# Patient Record
Sex: FEMALE | Race: WHITE | NOT HISPANIC OR LATINO | Employment: OTHER | URBAN - METROPOLITAN AREA
[De-identification: names, ages, dates, MRNs, and addresses within clinical notes are randomized per-mention and may not be internally consistent; named-entity substitution may affect disease eponyms.]

---

## 2018-03-19 ENCOUNTER — OFFICE VISIT (OUTPATIENT)
Dept: ORTHOPEDIC SURGERY | Facility: CLINIC | Age: 68
End: 2018-03-19

## 2018-03-19 VITALS
WEIGHT: 170 LBS | HEART RATE: 66 BPM | DIASTOLIC BLOOD PRESSURE: 74 MMHG | SYSTOLIC BLOOD PRESSURE: 151 MMHG | BODY MASS INDEX: 30.12 KG/M2 | HEIGHT: 63 IN

## 2018-03-19 DIAGNOSIS — S83.421A SPRAIN OF LATERAL COLLATERAL LIGAMENT OF RIGHT KNEE, INITIAL ENCOUNTER: ICD-10-CM

## 2018-03-19 DIAGNOSIS — S93.491A SPRAIN OF ANTERIOR TALOFIBULAR LIGAMENT OF RIGHT ANKLE, INITIAL ENCOUNTER: ICD-10-CM

## 2018-03-19 DIAGNOSIS — R52 PAIN: Primary | ICD-10-CM

## 2018-03-19 PROCEDURE — 99202 OFFICE O/P NEW SF 15 MIN: CPT | Performed by: ORTHOPAEDIC SURGERY

## 2018-03-19 NOTE — PROGRESS NOTES
NEW PATIENT    Patient: Aliyah Mayfield  : 1950    Primary Care Provider: Frank Colunga MD    Requesting Provider: As above    Pain and Edema of the Right Knee; Pain and Edema of the Right Ankle; and Pain of the Right Hip      History    Chief Complaint: right knee and ankle injury    History of Present Illness: this is an extremely pleasant 67 year old woman here with her .  Yesterday, she was getting out of her car (passenger side) when her right ankle got tangled in her purse strap, causing her to fall onto the pavement with her right foot still in the car.  She has had right ankle and knee pain and swelling, with some sciatica intermittently.  She has taken advil and some pain meds her  had from the dentist.  She notes that today the ankle and sciatica are improved, the knee is very painful with weight bearing.   She has been nauseated with the pain at times.  No other injury.      No current outpatient prescriptions on file prior to visit.     No current facility-administered medications on file prior to visit.       Allergies   Allergen Reactions   • Codeine Anaphylaxis     rash   • Penicillins Anaphylaxis     Rash      History reviewed. No pertinent past medical history.  Past Surgical History:   Procedure Laterality Date   • CARPAL TUNNEL RELEASE     • CHOLECYSTECTOMY     • HAND SURGERY Right     CMCJ replacement- Dr. Olivares   • LUMBAR DISCECTOMY     • TONSILLECTOMY AND ADENOIDECTOMY     • WRIST SURGERY Right     ORIF     Family History   Problem Relation Age of Onset   • Hypertension Mother    • Heart attack Mother    • Stroke Mother    • Hypertension Father    • Heart attack Father    • Cancer Father       Social History     Social History   • Marital status:      Spouse name: N/A   • Number of children: N/A   • Years of education: N/A     Occupational History   • Not on file.     Social History Main Topics   • Smoking status: Former Smoker     Types: Cigarettes      "Start date: 1970     Quit date: 1980   • Smokeless tobacco: Never Used   • Alcohol use No   • Drug use: No   • Sexual activity: Defer     Other Topics Concern   • Not on file     Social History Narrative   • No narrative on file        Review of Systems   Constitutional: Negative.    HENT: Negative.    Eyes: Negative.    Respiratory: Negative.    Cardiovascular: Negative.    Gastrointestinal: Negative.    Endocrine: Negative.    Genitourinary: Negative.    Musculoskeletal: Positive for arthralgias, joint swelling and myalgias.   Skin: Negative.    Allergic/Immunologic: Negative.    Neurological: Negative.    Hematological: Negative.    Psychiatric/Behavioral: Negative.        The following portions of the patient's history were reviewed and updated as appropriate: allergies, current medications, past family history, past medical history, past social history, past surgical history and problem list.    Physical Exam:   /74   Pulse 66   Ht 160 cm (63\")   Wt 77.1 kg (170 lb)   BMI 30.11 kg/m²   GENERAL: Body habitus: overweight    Lower extremity edema: Left: none; Right: 1+ pitting    Varicose veins:  Left: none; Right: none    Gait: in wheelchair     Mental Status:  awake and alert; oriented to person, place, and time    Voice:  clear  SKIN:  Normal and warm and dry    Hair Growth:  Right:normal; Left:  normal  NAILS: Toenails: normal  HEENT: Head: Normocephalic, atraumatic,  without obvious abnormality.  eye: normal external eye, no icterus  ears: normal external ears  nose: normal external nose  pharynx: dental hygiene adequate  PULM:  Repiratory effort normal  CV:  Dorsalis Pedis:  Right: 2+; Left:2+    Posterior Tibial: Right:2+; Left:2+    Capillary Refill:  Brisk  MSK:  Hand:sensation intact      Tibia:  Right:  right knee is tender over the LCL and lateral joint line, stable to varus and valgus.  trace effusion, stable to anterior and posterior drawer and lachmans.  full active extension, flexion to " 90 deg.  tender over posterior capsule.  mild Tinel's at common peroneal over fibular head, distal sensation intact to light touch; Left:  non tender      Ankle:  Right: no swelling around ankle, very slightly tender over anterior ankle, stable to anterior drawer, full ROM, sensation intact, all motors fire; Left:  non tender      Foot:  Right:  non tender; Left:  non tender      NEURO: Heel Walking:  Right:  unable to test; Left:  unable to test    Toe Walking:  Right:  unable to test; Left:  unable to test     Banner-Elmer 5.07 monofilament test: not evaluated    Lower extremity sensation: intact          Calf Atrophy:none    Motor Function: all 5/5- on the right leg all motors fire, some give-way weaknes at the knee due to pain, quad mechanism intact         Medical Decision Making    Data Review:   ordered and reviewed x-rays today    Assessment and Plan/ Diagnosis/Treatment options:   1.Sprain of lateral collateral ligament of right knee, initial encounter  She has an LCL sprain, not unstable.  No fracture and only minimal effusion on xray indicates little intra-articular pathology.  She also has a sprain of the ankle, due to the twisting mechanism of injury.  I recommend a knee immobilizer, she may weight bear as tolerated.  I will see her again in 2 weeks for knee xray (weight bearing if possible) and likely start PT at that time.  She was given a 1 week off work note, if she needs longer she will call back.  We also gave her crutches to assist mobilit  -  Knee Orthosis, Immobilizer, Canvas Longitudinal, Prefabricated, Off-The-shelf    3. Sprain of anterior talofibular ligament of right ankle, initial encounter  As above

## 2018-04-02 ENCOUNTER — OFFICE VISIT (OUTPATIENT)
Dept: ORTHOPEDIC SURGERY | Facility: CLINIC | Age: 68
End: 2018-04-02

## 2018-04-02 DIAGNOSIS — S83.421D SPRAIN OF LATERAL COLLATERAL LIGAMENT OF RIGHT KNEE, SUBSEQUENT ENCOUNTER: Primary | ICD-10-CM

## 2018-04-02 DIAGNOSIS — S93.491D SPRAIN OF ANTERIOR TALOFIBULAR LIGAMENT OF RIGHT ANKLE, SUBSEQUENT ENCOUNTER: ICD-10-CM

## 2018-04-02 PROCEDURE — 99212 OFFICE O/P EST SF 10 MIN: CPT | Performed by: ORTHOPAEDIC SURGERY

## 2018-04-02 NOTE — PROGRESS NOTES
ESTABLISHED PATIENT    Patient: Aliyha Mayfield  : 1950    Primary Care Provider: Frank Colunga MD    Requesting Provider: As above    Follow-up (2 weeks - Sprain of lateral collateral ligament of right knee & Sprain of anterior talofibular ligament of right ankle)          History of Present Illness: Right knee pain right ankle pain, she has been wearing the knee immobilizer.  Her swelling and pain have diminished.  She still sore on the lateral knee.    No current outpatient prescriptions on file prior to visit.     No current facility-administered medications on file prior to visit.       Allergies   Allergen Reactions   • Codeine Anaphylaxis     rash   • Penicillins Anaphylaxis     Rash      History reviewed. No pertinent past medical history.  Past Surgical History:   Procedure Laterality Date   • CARPAL TUNNEL RELEASE     • CHOLECYSTECTOMY     • HAND SURGERY Right     CMCJ replacement- Dr. Olivares   • LUMBAR DISCECTOMY     • TONSILLECTOMY AND ADENOIDECTOMY     • WRIST SURGERY Right     ORIF     Family History   Problem Relation Age of Onset   • Hypertension Mother    • Heart attack Mother    • Stroke Mother    • Hypertension Father    • Heart attack Father    • Cancer Father       Social History     Social History   • Marital status:      Spouse name: N/A   • Number of children: N/A   • Years of education: N/A     Occupational History   • Not on file.     Social History Main Topics   • Smoking status: Former Smoker     Types: Cigarettes     Start date:      Quit date:    • Smokeless tobacco: Never Used   • Alcohol use No   • Drug use: No   • Sexual activity: Defer     Other Topics Concern   • Not on file     Social History Narrative   • No narrative on file        Review of Systems    The following portions of the patient's history were reviewed and updated as appropriate: allergies, current medications, past family history, past medical history, past social history, past surgical  history and problem list.    Physical Exam:   There were no vitals taken for this visit.  GENERAL: Body habitus: overweight    Right knee has a trace effusion.  Mild swelling laterally.  Active quad extension.  Flexion to about 90°.  Pain with varus stress over the lateral ligaments, but it is stable.  No medial instability no medial pain.  Tender over the lateral collateral ligament.  Mildly tender over the ankle anterior talofibular ligament  Medical Decision Making    Data Review:   ordered and reviewed x-rays today    Assessment/Plan/Diagnosis/Treatment Options:   1. Sprain of lateral collateral ligament of right knee, subsequent encounter  Radiographs do not show any occult fracture in the knee.  I think she may start weaning out of the knee immobilizer and start physical therapy.  Would recommend PT for the knee and the ankle.  I'll see her again in 6-8 weeks.

## 2018-11-07 ENCOUNTER — TRANSCRIBE ORDERS (OUTPATIENT)
Dept: ADMINISTRATIVE | Facility: HOSPITAL | Age: 68
End: 2018-11-07

## 2018-11-07 DIAGNOSIS — Z12.31 VISIT FOR SCREENING MAMMOGRAM: Primary | ICD-10-CM

## 2018-11-07 DIAGNOSIS — N95.9 MENOPAUSAL AND POSTMENOPAUSAL DISORDER: ICD-10-CM

## 2018-11-30 DIAGNOSIS — Z12.11 SCREENING FOR COLON CANCER: Primary | ICD-10-CM

## 2018-11-30 RX ORDER — SODIUM, POTASSIUM,MAG SULFATES 17.5-3.13G
SOLUTION, RECONSTITUTED, ORAL ORAL
Qty: 2 BOTTLE | Refills: 0 | Status: SHIPPED | OUTPATIENT
Start: 2018-11-30 | End: 2019-03-12

## 2018-12-07 ENCOUNTER — OUTSIDE FACILITY SERVICE (OUTPATIENT)
Dept: GASTROENTEROLOGY | Facility: CLINIC | Age: 68
End: 2018-12-07

## 2018-12-07 ENCOUNTER — LAB REQUISITION (OUTPATIENT)
Dept: LAB | Facility: HOSPITAL | Age: 68
End: 2018-12-07

## 2018-12-07 DIAGNOSIS — R19.7 DIARRHEA: ICD-10-CM

## 2018-12-07 PROCEDURE — 88305 TISSUE EXAM BY PATHOLOGIST: CPT | Performed by: INTERNAL MEDICINE

## 2018-12-07 PROCEDURE — 45380 COLONOSCOPY AND BIOPSY: CPT | Performed by: INTERNAL MEDICINE

## 2018-12-07 PROCEDURE — 43239 EGD BIOPSY SINGLE/MULTIPLE: CPT | Performed by: INTERNAL MEDICINE

## 2018-12-10 LAB
CYTO UR: NORMAL
LAB AP CASE REPORT: NORMAL
LAB AP CLINICAL INFORMATION: NORMAL
PATH REPORT.FINAL DX SPEC: NORMAL
PATH REPORT.GROSS SPEC: NORMAL

## 2018-12-14 ENCOUNTER — TELEPHONE (OUTPATIENT)
Dept: GASTROENTEROLOGY | Facility: CLINIC | Age: 68
End: 2018-12-14

## 2018-12-14 DIAGNOSIS — K28.9 ANASTOMOTIC ULCER S/P GASTRIC BYPASS: Primary | ICD-10-CM

## 2018-12-14 DIAGNOSIS — T85.898A ANASTOMOTIC ULCER S/P GASTRIC BYPASS: Primary | ICD-10-CM

## 2018-12-14 RX ORDER — ESOMEPRAZOLE MAGNESIUM 40 MG/1
40 CAPSULE, DELAYED RELEASE ORAL DAILY
Qty: 30 CAPSULE | Refills: 5 | Status: SHIPPED | OUTPATIENT
Start: 2018-12-14 | End: 2019-03-12

## 2018-12-14 NOTE — TELEPHONE ENCOUNTER
I called and spoke with MsJose Tran on the phone this morning.  She did have some ulceration at the gastrojejunal anastomosis.  I will go ahead and prescribe medication at this time.

## 2018-12-28 ENCOUNTER — HOSPITAL ENCOUNTER (OUTPATIENT)
Dept: BONE DENSITY | Facility: HOSPITAL | Age: 68
Discharge: HOME | End: 2018-12-28
Attending: INTERNAL MEDICINE

## 2018-12-28 ENCOUNTER — HOSPITAL ENCOUNTER (OUTPATIENT)
Dept: MAMMOGRAPHY | Facility: HOSPITAL | Age: 68
Discharge: HOME | End: 2018-12-28
Attending: INTERNAL MEDICINE | Admitting: INTERNAL MEDICINE

## 2018-12-28 DIAGNOSIS — Z12.31 VISIT FOR SCREENING MAMMOGRAM: ICD-10-CM

## 2018-12-28 DIAGNOSIS — N95.9 MENOPAUSAL AND POSTMENOPAUSAL DISORDER: ICD-10-CM

## 2018-12-28 PROCEDURE — 77067 SCR MAMMO BI INCL CAD: CPT

## 2018-12-28 PROCEDURE — 77063 BREAST TOMOSYNTHESIS BI: CPT

## 2018-12-28 PROCEDURE — 77063 BREAST TOMOSYNTHESIS BI: CPT | Performed by: RADIOLOGY

## 2018-12-28 PROCEDURE — 77080 DXA BONE DENSITY AXIAL: CPT

## 2018-12-28 PROCEDURE — 77067 SCR MAMMO BI INCL CAD: CPT | Performed by: RADIOLOGY

## 2019-01-07 PROBLEM — K25.3 ACUTE GASTRIC ULCER WITHOUT HEMORRHAGE OR PERFORATION: Status: ACTIVE | Noted: 2019-01-07

## 2019-01-07 PROBLEM — K57.30 DIVERTICULOSIS OF LARGE INTESTINE: Status: ACTIVE | Noted: 2019-01-07

## 2019-03-08 PROBLEM — M54.2 CERVICALGIA: Status: ACTIVE | Noted: 2019-03-08

## 2019-03-08 PROBLEM — K57.30 DIVERTICULOSIS OF LARGE INTESTINE WITHOUT HEMORRHAGE: Status: ACTIVE | Noted: 2019-03-08

## 2019-03-08 PROBLEM — R55 SYNCOPE AND COLLAPSE: Status: ACTIVE | Noted: 2019-03-08

## 2019-03-08 PROBLEM — M54.50 LOW BACK PAIN AT MULTIPLE SITES: Status: ACTIVE | Noted: 2019-03-08

## 2019-03-08 PROBLEM — N95.9 MENOPAUSAL AND PERIMENOPAUSAL DISORDER: Status: ACTIVE | Noted: 2019-03-08

## 2019-03-08 PROBLEM — G43.109 MIGRAINE WITH AURA AND WITHOUT STATUS MIGRAINOSUS, NOT INTRACTABLE: Status: ACTIVE | Noted: 2019-03-08

## 2019-03-08 PROBLEM — R42 VERTIGO: Status: ACTIVE | Noted: 2019-03-08

## 2019-03-08 PROBLEM — F32.0 MILD MAJOR DEPRESSION (HCC): Status: ACTIVE | Noted: 2019-03-08

## 2019-03-08 PROBLEM — I10 ESSENTIAL (PRIMARY) HYPERTENSION: Status: ACTIVE | Noted: 2019-03-08

## 2019-03-08 PROBLEM — Z91.09 ENVIRONMENTAL ALLERGIES: Status: ACTIVE | Noted: 2019-03-08

## 2019-03-12 ENCOUNTER — OFFICE VISIT (OUTPATIENT)
Dept: INTERNAL MEDICINE | Facility: CLINIC | Age: 69
End: 2019-03-12

## 2019-03-12 VITALS
HEART RATE: 64 BPM | WEIGHT: 179 LBS | SYSTOLIC BLOOD PRESSURE: 106 MMHG | DIASTOLIC BLOOD PRESSURE: 64 MMHG | HEIGHT: 63 IN | BODY MASS INDEX: 31.71 KG/M2

## 2019-03-12 DIAGNOSIS — R30.0 DYSURIA: ICD-10-CM

## 2019-03-12 DIAGNOSIS — N30.00 ACUTE CYSTITIS WITHOUT HEMATURIA: Primary | ICD-10-CM

## 2019-03-12 LAB
BILIRUB BLD-MCNC: ABNORMAL MG/DL
CLARITY, POC: ABNORMAL
COLOR UR: ABNORMAL
GLUCOSE UR STRIP-MCNC: NEGATIVE MG/DL
KETONES UR QL: ABNORMAL
LEUKOCYTE EST, POC: NEGATIVE
NITRITE UR-MCNC: NEGATIVE MG/ML
PH UR: 5.5 [PH] (ref 5–8)
PROT UR STRIP-MCNC: ABNORMAL MG/DL
RBC # UR STRIP: ABNORMAL /UL
SP GR UR: 1.03 (ref 1–1.03)
UROBILINOGEN UR QL: NORMAL

## 2019-03-12 PROCEDURE — 81003 URINALYSIS AUTO W/O SCOPE: CPT | Performed by: NURSE PRACTITIONER

## 2019-03-12 PROCEDURE — 99213 OFFICE O/P EST LOW 20 MIN: CPT | Performed by: NURSE PRACTITIONER

## 2019-03-12 RX ORDER — SULFAMETHOXAZOLE AND TRIMETHOPRIM 800; 160 MG/1; MG/1
1 TABLET ORAL 2 TIMES DAILY
Qty: 10 TABLET | Refills: 0 | Status: SHIPPED | OUTPATIENT
Start: 2019-03-12 | End: 2019-04-30

## 2019-03-12 RX ORDER — FLUCONAZOLE 150 MG/1
150 TABLET ORAL ONCE
Qty: 1 TABLET | Refills: 1 | Status: SHIPPED | OUTPATIENT
Start: 2019-03-12 | End: 2019-03-12

## 2019-03-12 NOTE — PROGRESS NOTES
Aliyah Mayfield  1950  [id number]  Patient Care Team:  Frank Colunga MD as PCP - General (Internal Medicine)    Aliyah Mayfield is a pleasant 68 y.o. female who presents for evaluation of Urinary Tract Infection (burning)      Chief Complaint   Patient presents with   • Urinary Tract Infection     burning       HPI:   Dysuria x 4 days.  No abd pain, n/v or fever.  No hematuria. Has increased hydration.  Last UTI about 2 mo ago.    Past Medical History:   Diagnosis Date   • Acute gastric ulcer without hemorrhage or perforation 2019   • Ankle fracture    • Carpal tunnel syndrome    • Diverticulosis    • Hearing loss    • Hypertension    • Lumbar back pain    • Situational depression    • Syncope and collapse    • Wrist fracture        Past Surgical History:   Procedure Laterality Date   • BREAST EXCISIONAL BIOPSY      Age 22   • CARPAL TUNNEL RELEASE     • CERVICAL LAMINECTOMY     • CERVICAL LAMINECTOMY     • CHOLECYSTECTOMY     • GASTRIC BYPASS     • HAND SURGERY Right     CMCJ replacement- Dr. Olivares   • JOINT REPLACEMENT      Thumb Joint Replacement   • LUMBAR DISCECTOMY     • TONSILLECTOMY AND ADENOIDECTOMY     • TUBAL ABDOMINAL LIGATION Bilateral    • WRIST SURGERY Right     ORIF       Family History   Problem Relation Age of Onset   • Hypertension Mother    • Heart attack Mother    • Stroke Mother    • Obesity Mother    • Hypertension Father    • Heart attack Father 45   • Cancer Father    • Breast cancer Maternal Aunt    • Heart attack Brother 52   • Ovarian cancer Neg Hx        Social History     Tobacco Use   Smoking Status Former Smoker   • Types: Cigarettes   • Start date:    • Last attempt to quit:    • Years since quittin.2   Smokeless Tobacco Never Used   Tobacco Comment    quit 1992       Allergies   Allergen Reactions   • Codeine Anaphylaxis     rash   • Penicillins Anaphylaxis     Rash       Review of Systems   Constitutional: Negative for chills, fatigue  "and fever.   HENT: Negative for congestion, ear pain and sinus pressure.    Respiratory: Negative for cough, chest tightness, shortness of breath and wheezing.    Cardiovascular: Negative for chest pain and palpitations.   Gastrointestinal: Negative for abdominal pain, blood in stool and constipation.   Skin: Negative for color change.   Allergic/Immunologic: Negative for environmental allergies.   Neurological: Negative for dizziness, speech difficulty and headache.   Psychiatric/Behavioral: Negative for decreased concentration. The patient is not nervous/anxious.        Vitals:    03/12/19 1002   BP: 106/64   BP Location: Left arm   Patient Position: Sitting   Cuff Size: Adult   Pulse: 64   Weight: 81.2 kg (179 lb)   Height: 160 cm (63\")         Current Outpatient Medications:   •  fluconazole (DIFLUCAN) 150 MG tablet, Take 1 tablet by mouth 1 (One) Time for 1 dose., Disp: 1 tablet, Rfl: 1  •  sulfamethoxazole-trimethoprim (BACTRIM DS) 800-160 MG per tablet, Take 1 tablet by mouth 2 (Two) Times a Day., Disp: 10 tablet, Rfl: 0    Physical Exam   Constitutional: She appears well-developed and well-nourished.   Pulmonary/Chest: Effort normal.   Skin: Skin is warm and dry.   Psychiatric: She has a normal mood and affect. Her behavior is normal.   Vitals reviewed.        Assessment/Plan:    Problem List Items Addressed This Visit     None      Visit Diagnoses     Acute cystitis without hematuria    -  Primary    Dysuria        Relevant Orders    POC Urinalysis Dipstick, Automated (Completed)    Urine Culture - Urine, Urine, Clean Catch          Plan of care reviewed with patient at the conclusion of today's visit. Education was provided regarding diagnosis, management and any prescribed or recommended OTC medications.  Patient verbalizes understanding of and agreement with management plan.    Return if symptoms worsen or fail to improve.    *Note that portions of this note were completed with a voice recognition " program.  Efforts were made to edit the dictation but occasionally words are transcribed.    Stephanie Younger, APRN

## 2019-03-18 ENCOUNTER — OFFICE VISIT (OUTPATIENT)
Dept: INTERNAL MEDICINE | Facility: CLINIC | Age: 69
End: 2019-03-18

## 2019-03-18 VITALS
BODY MASS INDEX: 30.65 KG/M2 | HEART RATE: 64 BPM | WEIGHT: 173 LBS | DIASTOLIC BLOOD PRESSURE: 66 MMHG | SYSTOLIC BLOOD PRESSURE: 126 MMHG | HEIGHT: 63 IN

## 2019-03-18 DIAGNOSIS — M54.50 LOW BACK PAIN AT MULTIPLE SITES: ICD-10-CM

## 2019-03-18 DIAGNOSIS — G43.109 MIGRAINE WITH AURA AND WITHOUT STATUS MIGRAINOSUS, NOT INTRACTABLE: ICD-10-CM

## 2019-03-18 DIAGNOSIS — F32.0 MILD MAJOR DEPRESSION (HCC): ICD-10-CM

## 2019-03-18 DIAGNOSIS — I10 ESSENTIAL (PRIMARY) HYPERTENSION: Primary | ICD-10-CM

## 2019-03-18 DIAGNOSIS — K25.3 ACUTE GASTRIC ULCER WITHOUT HEMORRHAGE OR PERFORATION: ICD-10-CM

## 2019-03-18 DIAGNOSIS — E66.09 CLASS 1 OBESITY DUE TO EXCESS CALORIES WITH SERIOUS COMORBIDITY IN ADULT, UNSPECIFIED BMI: ICD-10-CM

## 2019-03-18 DIAGNOSIS — K12.0 APHTHOUS ULCER OF MOUTH: ICD-10-CM

## 2019-03-18 PROBLEM — E66.9 CLASS 1 OBESITY IN ADULT: Status: ACTIVE | Noted: 2019-03-18

## 2019-03-18 PROCEDURE — 99214 OFFICE O/P EST MOD 30 MIN: CPT | Performed by: INTERNAL MEDICINE

## 2019-03-18 RX ORDER — ESOMEPRAZOLE MAGNESIUM 40 MG/1
40 CAPSULE, DELAYED RELEASE ORAL
Qty: 30 CAPSULE | Refills: 11 | Status: SHIPPED | OUTPATIENT
Start: 2019-03-18 | End: 2019-04-30 | Stop reason: SDUPTHER

## 2019-03-18 NOTE — PROGRESS NOTES
"Chief Complaint   Patient presents with   • Follow-up     Follow up on Hypertension.   History of Present Illness  68 y.o. white female presents for follow up on hypertension. She does not check it on her own. She was prescribed a new medication for gastric ulcers and is watching her diet closely. Still has some vomiting. Cant remember what medication she was prescribed. Her migraines are rare and made worse by the weather and allergies. Uses excedrin migraine.     Review of Systems   Constitutional: Negative for chills and fever.   Respiratory: Negative for cough and shortness of breath.    Cardiovascular: Negative for chest pain and palpitations.   Gastrointestinal: Positive for vomiting. Negative for abdominal pain and nausea.   Musculoskeletal: Positive for arthralgias and back pain.   Skin: Negative for rash.   Allergic/Immunologic: Positive for environmental allergies.   Neurological: Positive for dizziness and headaches. Negative for light-headedness.   Psychiatric/Behavioral: Negative for sleep disturbance.   All other systems reviewed and are negative.    All other ROS reviewed and negative.    PMSFH:  The following portions of the patient's history were reviewed and updated as appropriate: allergies, current medications, past family history, past medical history, past social history, past surgical history and problem list.      Current Outpatient Medications:   •  esomeprazole (nexIUM) 40 MG capsule, Take 1 capsule by mouth Every Morning Before Breakfast., Disp: 30 capsule, Rfl: 11  •  sulfamethoxazole-trimethoprim (BACTRIM DS) 800-160 MG per tablet, Take 1 tablet by mouth 2 (Two) Times a Day., Disp: 10 tablet, Rfl: 0    VITALS:  /66   Pulse 64   Ht 160 cm (62.99\")   Wt 78.5 kg (173 lb)   BMI 30.65 kg/m²     Physical Exam   Constitutional: She is oriented to person, place, and time. She appears well-developed and well-nourished.   HENT:   Head: Normocephalic.   Right Ear: External ear normal. "   Left Ear: External ear normal.   Mouth/Throat: No oropharyngeal exudate.   2 apthous ulcers   Eyes: Conjunctivae and EOM are normal.   Cardiovascular: Normal rate, regular rhythm and normal heart sounds.   Pulmonary/Chest: Effort normal and breath sounds normal. No respiratory distress.   Abdominal: Soft. Bowel sounds are normal. There is no tenderness.   Musculoskeletal: She exhibits no edema.   Lymphadenopathy:     She has no cervical adenopathy.   Neurological: She is alert and oriented to person, place, and time.   Skin: Skin is warm and dry.   Psychiatric: She has a normal mood and affect. Her behavior is normal.   Nursing note and vitals reviewed.      LABS:  Results for orders placed or performed in visit on 03/12/19   POC Urinalysis Dipstick, Automated   Result Value Ref Range    Color Dark Yellow Yellow, Straw, Dark Yellow, Cara    Clarity, UA Slightly Cloudy (A) Clear    Specific Gravity  1.030 1.005 - 1.030    pH, Urine 5.5 5.0 - 8.0    Leukocytes Negative Negative    Nitrite, UA Negative Negative    Protein, POC Trace (A) Negative mg/dL    Glucose, UA Negative Negative, 1000 mg/dL (3+) mg/dL    Ketones, UA Trace (A) Negative    Urobilinogen, UA Normal Normal    Bilirubin Small (1+) (A) Negative    Blood, UA Trace (A) Negative       Procedures    ASSESSMENT/PLAN:  Problem List Items Addressed This Visit        Cardiovascular and Mediastinum    Essential (primary) hypertension - Primary     Discussed with the patient a low sodium diet (<2000mg/day) and discussed increasing regular aerobic exercise.  Recommend monitoring blood pressures with goal < 130 systolic and < 80 diastolic.         Migraine with aura and without status migrainosus, not intractable     Use tylenol as needed.               Digestive    Acute gastric ulcer without hemorrhage or perforation     Use 40mg esomeprazole capsule daily. Watch the diet especially avoiding fatty, spicy, and acidic foods.          Relevant Medications     esomeprazole (nexIUM) 40 MG capsule    Class 1 obesity in adult     Goal to consume large amounts of vegetables and fruits,heart healthy fats and low carbohydrate choices. Encourage aerobic exercise of walking, jogging or biking gradually up to 150 minute a week and 2-3 times of weight resistance. Employe behavioral modifications such as daily meditation and stopping eating and drinking calories after 7 pm.             Nervous and Auditory    Low back pain at multiple sites     Stretch daily.             Other    Mild major depression (CMS/HCC)     Counseled patient regarding multimodal approach with healthy nutrition, healthy sleep, regular physical activity, social activities, and meditation.         Aphthous ulcer of mouth     Use lysine 1000 mg to prevent               FOLLOW-UP:  Return in about 6 months (around 9/18/2019) for Medicare Wellness.      Electronically signed by:    Rick Carolina MD

## 2019-03-18 NOTE — ASSESSMENT & PLAN NOTE
Discussed with the patient a low sodium diet (<2000mg/day) and discussed increasing regular aerobic exercise.  Recommend monitoring blood pressures with goal < 130 systolic and < 80 diastolic.

## 2019-03-18 NOTE — ASSESSMENT & PLAN NOTE
Use 40mg esomeprazole capsule daily. Watch the diet especially avoiding fatty, spicy, and acidic foods.

## 2019-04-30 ENCOUNTER — OFFICE VISIT (OUTPATIENT)
Dept: INTERNAL MEDICINE | Facility: CLINIC | Age: 69
End: 2019-04-30

## 2019-04-30 VITALS
BODY MASS INDEX: 30.12 KG/M2 | WEIGHT: 170 LBS | SYSTOLIC BLOOD PRESSURE: 122 MMHG | TEMPERATURE: 97 F | HEIGHT: 63 IN | DIASTOLIC BLOOD PRESSURE: 74 MMHG | HEART RATE: 72 BPM

## 2019-04-30 DIAGNOSIS — J40 BRONCHITIS: Primary | ICD-10-CM

## 2019-04-30 PROCEDURE — 99213 OFFICE O/P EST LOW 20 MIN: CPT | Performed by: NURSE PRACTITIONER

## 2019-04-30 RX ORDER — ALBUTEROL SULFATE 90 UG/1
2 AEROSOL, METERED RESPIRATORY (INHALATION) EVERY 4 HOURS PRN
Qty: 1 INHALER | Refills: 0 | Status: SHIPPED | OUTPATIENT
Start: 2019-04-30 | End: 2019-10-07 | Stop reason: ALTCHOICE

## 2019-04-30 RX ORDER — AZITHROMYCIN 250 MG/1
TABLET, FILM COATED ORAL
Qty: 6 TABLET | Refills: 0 | Status: SHIPPED | OUTPATIENT
Start: 2019-04-30 | End: 2019-10-07 | Stop reason: ALTCHOICE

## 2019-04-30 RX ORDER — ESOMEPRAZOLE MAGNESIUM 40 MG/1
40 CAPSULE, DELAYED RELEASE ORAL
Qty: 30 CAPSULE | Refills: 11 | Status: SHIPPED | OUTPATIENT
Start: 2019-04-30 | End: 2019-04-30

## 2019-04-30 NOTE — PATIENT INSTRUCTIONS
Use albuterol 2 puffs every 4-6 hours as needed for wheezing, shortness of breath, chest tightness or excessive coughing.  Use zpack as directed  If not better or worse symptoms before flight on Friday return for steroid injection.  Continue mucinex twice a day

## 2019-04-30 NOTE — PROGRESS NOTES
Aliyah Mayfield  1950  [id number]  Patient Care Team:  Rick Carolina MD as PCP - General (Internal Medicine)    Aliyah Mayfield is a pleasant 68 y.o. female who presents for evaluation of Cough (productive cough with yellow sputum )      Chief Complaint   Patient presents with   • Cough     productive cough with yellow sputum        HPI:   Cough x few days, productive today and fever. Had scratchy throat, ear pressure. oritinally thought it was allergy to feather pillows after staying overnight at someone's house.  Denies wheezing, sob  Used mucinex without improvement.  Hx of seasonal allergies.  Sometimes uses singulair, no antihistamine    Past Medical History:   Diagnosis Date   • Acute gastric ulcer without hemorrhage or perforation 2019   • Ankle fracture    • Carpal tunnel syndrome    • Diverticulosis    • Hearing loss    • Hypertension    • Lumbar back pain    • Situational depression    • Syncope and collapse    • Wrist fracture        Past Surgical History:   Procedure Laterality Date   • BREAST EXCISIONAL BIOPSY      Age 22   • CARPAL TUNNEL RELEASE     • CERVICAL LAMINECTOMY     • CERVICAL LAMINECTOMY     • CHOLECYSTECTOMY     • GASTRIC BYPASS     • HAND SURGERY Right     CMCJ replacement- Dr. Olivares   • JOINT REPLACEMENT      Thumb Joint Replacement   • LUMBAR DISCECTOMY     • TONSILLECTOMY AND ADENOIDECTOMY     • TUBAL ABDOMINAL LIGATION Bilateral    • WRIST SURGERY Right     ORIF       Family History   Problem Relation Age of Onset   • Hypertension Mother    • Heart attack Mother    • Stroke Mother    • Obesity Mother    • Hypertension Father    • Heart attack Father 45   • Cancer Father    • Breast cancer Maternal Aunt    • Heart attack Brother 52   • Ovarian cancer Neg Hx        Social History     Tobacco Use   Smoking Status Former Smoker   • Types: Cigarettes   • Start date:    • Last attempt to quit:    • Years since quittin.3   Smokeless Tobacco Never Used  "  Tobacco Comment    quit 1/1/1992       Allergies   Allergen Reactions   • Codeine Anaphylaxis     rash   • Penicillins Anaphylaxis     Rash       Review of Systems   Constitutional: Positive for fatigue. Negative for chills and fever.   HENT: Positive for congestion, ear pain, sinus pressure and sore throat.    Respiratory: Positive for cough and chest tightness. Negative for shortness of breath and wheezing.    Cardiovascular: Negative for chest pain and palpitations.   Gastrointestinal: Negative for abdominal pain, blood in stool and constipation.   Skin: Negative for color change.   Allergic/Immunologic: Positive for environmental allergies.   Neurological: Positive for dizziness. Negative for speech difficulty and headache.   Psychiatric/Behavioral: Negative for decreased concentration. The patient is not nervous/anxious.        Vitals:    04/30/19 1642   BP: 122/74   BP Location: Left arm   Patient Position: Sitting   Cuff Size: Adult   Pulse: 72   Temp: 97 °F (36.1 °C)   TempSrc: Temporal   Weight: 77.1 kg (170 lb)   Height: 160 cm (62.99\")   PainSc: 0-No pain         Current Outpatient Medications:   •  albuterol sulfate  (90 Base) MCG/ACT inhaler, Inhale 2 puffs Every 4 (Four) Hours As Needed for Wheezing or Shortness of Air., Disp: 1 inhaler, Rfl: 0  •  azithromycin (ZITHROMAX Z-LIAM) 250 MG tablet, Take 2 tablets the first day, then 1 tablet daily for 4 days., Disp: 6 tablet, Rfl: 0    Physical Exam   Constitutional: She appears well-developed and well-nourished.   HENT:   Head: Normocephalic and atraumatic.   Right Ear: External ear normal.   Left Ear: External ear normal.   Mouth/Throat: Oropharynx is clear and moist.   Eyes: Conjunctivae and EOM are normal.   Neck: Normal range of motion. Neck supple.   Cardiovascular: Normal rate, regular rhythm and normal heart sounds.   Pulmonary/Chest: Effort normal and breath sounds normal. She has no decreased breath sounds. She has no wheezes. She has no " rhonchi.   Abdominal: Soft. Bowel sounds are normal.   Musculoskeletal: Normal range of motion.   Lymphadenopathy:     She has no cervical adenopathy.   Neurological: She is alert.   Skin: Skin is warm and dry.   Psychiatric: She has a normal mood and affect. Her behavior is normal. Thought content normal.         Assessment/Plan:    Problem List Items Addressed This Visit     None      Visit Diagnoses     Bronchitis    -  Primary    Relevant Medications    albuterol sulfate  (90 Base) MCG/ACT inhaler    azithromycin (ZITHROMAX Z-LIAM) 250 MG tablet          Plan of care reviewed with patient at the conclusion of today's visit. Education was provided regarding diagnosis, management and any prescribed or recommended OTC medications.  Patient verbalizes understanding of and agreement with management plan.    Return if symptoms worsen or fail to improve.    *Note that portions of this note were completed with a voice recognition program.  Efforts were made to edit the dictation but occasionally words are transcribed.    LIZ De Anda

## 2019-06-19 ENCOUNTER — TELEPHONE (OUTPATIENT)
Dept: INTERNAL MEDICINE | Facility: CLINIC | Age: 69
End: 2019-06-19

## 2019-06-19 NOTE — TELEPHONE ENCOUNTER
In March you ordered a POCT iurinalysis & urine culture.    The urinalysis was performed.    The urine culture is not marked as collected & does not have results.     Can the urine culture order from March be canceled?

## 2019-09-11 ENCOUNTER — TELEPHONE (OUTPATIENT)
Dept: INTERNAL MEDICINE | Facility: CLINIC | Age: 69
End: 2019-09-11

## 2019-09-11 DIAGNOSIS — N39.0 ACUTE UTI: Primary | ICD-10-CM

## 2019-09-11 PROCEDURE — 81003 URINALYSIS AUTO W/O SCOPE: CPT | Performed by: INTERNAL MEDICINE

## 2019-09-11 NOTE — TELEPHONE ENCOUNTER
I will put in POC urine and please see if on call with treat with positive or I can send in something tonight  There is a urine culture in but do not culture if there is no nitatres or leuk

## 2019-09-11 NOTE — TELEPHONE ENCOUNTER
Reason for Call: POSSIBLE UTI    Symptoms:   Burning/hurting , darker than usual , can't tell if there is any odor because per her  her  doesn't work , or frequency/urgency because she does that all the time drinks a lot of tea and water     Onset (when it began):  A little last night but this morning it is intense     Have they tried anything?  no    Other pertinent info:  PT WANTS TO KNOW IF SHE CAN JUST DROP OFF A URINE SAMPLE OPPOSED TO COMING IN BECAUSE FINANCES ARE TIGHT. SHE RECENTLY LOST HER

## 2019-09-12 LAB
BILIRUB BLD-MCNC: ABNORMAL MG/DL
CLARITY, POC: CLEAR
COLOR UR: ABNORMAL
GLUCOSE UR STRIP-MCNC: NEGATIVE MG/DL
KETONES UR QL: ABNORMAL
LEUKOCYTE EST, POC: NEGATIVE
NITRITE UR-MCNC: NEGATIVE MG/ML
PH UR: 5 [PH] (ref 5–8)
PROT UR STRIP-MCNC: NEGATIVE MG/DL
RBC # UR STRIP: ABNORMAL /UL
SP GR UR: 1.03 (ref 1–1.03)
UROBILINOGEN UR QL: NORMAL

## 2019-09-13 ENCOUNTER — TELEPHONE (OUTPATIENT)
Dept: INTERNAL MEDICINE | Facility: CLINIC | Age: 69
End: 2019-09-13

## 2019-09-13 DIAGNOSIS — R30.0 DYSURIA: Primary | ICD-10-CM

## 2019-09-13 DIAGNOSIS — R31.29 OTHER MICROSCOPIC HEMATURIA: ICD-10-CM

## 2019-09-13 NOTE — TELEPHONE ENCOUNTER
----- Message from Esperanza Guevara LPN sent at 9/13/2019  9:45 AM EDT -----  Pt is willing to see urology

## 2019-09-30 ENCOUNTER — TELEPHONE (OUTPATIENT)
Dept: INTERNAL MEDICINE | Facility: CLINIC | Age: 69
End: 2019-09-30

## 2019-09-30 DIAGNOSIS — F32.0 MILD MAJOR DEPRESSION (HCC): Primary | ICD-10-CM

## 2019-09-30 DIAGNOSIS — I10 ESSENTIAL (PRIMARY) HYPERTENSION: ICD-10-CM

## 2019-10-03 ENCOUNTER — LAB (OUTPATIENT)
Dept: LAB | Facility: HOSPITAL | Age: 69
End: 2019-10-03

## 2019-10-03 ENCOUNTER — TELEPHONE (OUTPATIENT)
Dept: INTERNAL MEDICINE | Facility: CLINIC | Age: 69
End: 2019-10-03

## 2019-10-03 DIAGNOSIS — F32.0 MILD MAJOR DEPRESSION (HCC): ICD-10-CM

## 2019-10-03 DIAGNOSIS — I10 ESSENTIAL (PRIMARY) HYPERTENSION: ICD-10-CM

## 2019-10-03 LAB
ALBUMIN SERPL-MCNC: 4.4 G/DL (ref 3.5–5.2)
ALBUMIN/GLOB SERPL: 1.6 G/DL
ALP SERPL-CCNC: 100 U/L (ref 39–117)
ALT SERPL W P-5'-P-CCNC: 13 U/L (ref 1–33)
ANION GAP SERPL CALCULATED.3IONS-SCNC: 10.6 MMOL/L (ref 5–15)
AST SERPL-CCNC: 21 U/L (ref 1–32)
BILIRUB SERPL-MCNC: 0.3 MG/DL (ref 0.2–1.2)
BUN BLD-MCNC: 8 MG/DL (ref 8–23)
BUN/CREAT SERPL: 9.3 (ref 7–25)
CALCIUM SPEC-SCNC: 9.4 MG/DL (ref 8.6–10.5)
CHLORIDE SERPL-SCNC: 109 MMOL/L (ref 98–107)
CHOLEST SERPL-MCNC: 228 MG/DL (ref 0–200)
CO2 SERPL-SCNC: 26.4 MMOL/L (ref 22–29)
CREAT BLD-MCNC: 0.86 MG/DL (ref 0.57–1)
GFR SERPL CREATININE-BSD FRML MDRD: 66 ML/MIN/1.73
GLOBULIN UR ELPH-MCNC: 2.8 GM/DL
GLUCOSE BLD-MCNC: 93 MG/DL (ref 65–99)
HDLC SERPL-MCNC: 59 MG/DL (ref 40–60)
LDLC SERPL CALC-MCNC: 150 MG/DL (ref 0–100)
LDLC/HDLC SERPL: 2.55 {RATIO}
POTASSIUM BLD-SCNC: 5.5 MMOL/L (ref 3.5–5.2)
PROT SERPL-MCNC: 7.2 G/DL (ref 6–8.5)
SODIUM BLD-SCNC: 146 MMOL/L (ref 136–145)
TRIGL SERPL-MCNC: 93 MG/DL (ref 0–150)
TSH SERPL DL<=0.05 MIU/L-ACNC: 2.57 UIU/ML (ref 0.27–4.2)
VLDLC SERPL-MCNC: 18.6 MG/DL (ref 5–40)

## 2019-10-03 PROCEDURE — 80053 COMPREHEN METABOLIC PANEL: CPT

## 2019-10-03 PROCEDURE — 84443 ASSAY THYROID STIM HORMONE: CPT

## 2019-10-03 PROCEDURE — 80061 LIPID PANEL: CPT

## 2019-10-03 NOTE — TELEPHONE ENCOUNTER
Asia with the lab stated the pt refused to give a urine sample for ma2 because she gave a urine sample to [facility]. I looked on the [facility] portal and it was a UA and culture completed. Can this order be cancelled?

## 2019-10-07 ENCOUNTER — OFFICE VISIT (OUTPATIENT)
Dept: INTERNAL MEDICINE | Facility: CLINIC | Age: 69
End: 2019-10-07

## 2019-10-07 VITALS
HEART RATE: 68 BPM | SYSTOLIC BLOOD PRESSURE: 116 MMHG | BODY MASS INDEX: 26.93 KG/M2 | WEIGHT: 152 LBS | HEIGHT: 63 IN | DIASTOLIC BLOOD PRESSURE: 80 MMHG

## 2019-10-07 DIAGNOSIS — R63.4 WEIGHT LOSS: ICD-10-CM

## 2019-10-07 DIAGNOSIS — Z00.00 MEDICARE ANNUAL WELLNESS VISIT, INITIAL: Primary | ICD-10-CM

## 2019-10-07 DIAGNOSIS — E87.5 HYPERKALEMIA: ICD-10-CM

## 2019-10-07 DIAGNOSIS — I10 ESSENTIAL (PRIMARY) HYPERTENSION: ICD-10-CM

## 2019-10-07 DIAGNOSIS — F32.0 MILD MAJOR DEPRESSION (HCC): ICD-10-CM

## 2019-10-07 DIAGNOSIS — Z23 NEED FOR VACCINATION: ICD-10-CM

## 2019-10-07 DIAGNOSIS — E66.3 OVERWEIGHT (BMI 25.0-29.9): ICD-10-CM

## 2019-10-07 DIAGNOSIS — Z20.5 EXPOSURE TO HEPATITIS C: ICD-10-CM

## 2019-10-07 PROCEDURE — G0438 PPPS, INITIAL VISIT: HCPCS | Performed by: INTERNAL MEDICINE

## 2019-10-07 PROCEDURE — 90670 PCV13 VACCINE IM: CPT | Performed by: INTERNAL MEDICINE

## 2019-10-07 PROCEDURE — 99397 PER PM REEVAL EST PAT 65+ YR: CPT | Performed by: INTERNAL MEDICINE

## 2019-10-07 PROCEDURE — G0009 ADMIN PNEUMOCOCCAL VACCINE: HCPCS | Performed by: INTERNAL MEDICINE

## 2019-10-07 NOTE — PROGRESS NOTES
QUICK REFERENCE INFORMATION:  The ABCs of the Annual Wellness Visit    Subsequent Medicare Wellness Visit    HEALTH RISK ASSESSMENT    1950    Recent Hospitalizations:  No hospitalization(s) within the last year..        Current Medical Providers:  Patient Care Team:  Rick Carolina MD as PCP - General (Internal Medicine)        Smoking Status:  Social History     Tobacco Use   Smoking Status Former Smoker   • Types: Cigarettes   • Start date:    • Last attempt to quit:    • Years since quittin.7   Smokeless Tobacco Never Used   Tobacco Comment    quit 1992       Alcohol Consumption:  Social History     Substance and Sexual Activity   Alcohol Use No       Depression Screen:   PHQ-2/PHQ-9 Depression Screening 10/7/2019   Little interest or pleasure in doing things 3   Feeling down, depressed, or hopeless 3   Trouble falling or staying asleep, or sleeping too much 2   Feeling tired or having little energy 2   Poor appetite or overeating 2   Feeling bad about yourself - or that you are a failure or have let yourself or your family down 3   Trouble concentrating on things, such as reading the newspaper or watching television 2   Moving or speaking so slowly that other people could have noticed. Or the opposite - being so fidgety or restless that you have been moving around a lot more than usual 0   Thoughts that you would be better off dead, or of hurting yourself in some way 2   Total Score 19   If you checked off any problems, how difficult have these problems made it for you to do your work, take care of things at home, or get along with other people? Extremely dIfficult       Health Habits and Functional and Cognitive Screening:  Functional & Cognitive Status 10/7/2019   Do you have difficulty preparing food and eating? No   Do you have difficulty bathing yourself, getting dressed or grooming yourself? No   Do you have difficulty using the toilet? No   Do you have difficulty moving around  from place to place? No   Do you have trouble with steps or getting out of a bed or a chair? Yes   Current Diet Well Balanced Diet   Dental Exam Up to date   Eye Exam Not up to date   Exercise (times per week) 5 times per week   Current Exercise Activities Include Cardiovasular Workout on Exercise Equipment   Do you need help using the phone?  No   Are you deaf or do you have serious difficulty hearing?  Yes   Do you need help with transportation? No   Do you need help shopping? No   Do you need help preparing meals?  No   Do you need help with housework?  Yes   Do you need help with laundry? No   Do you need help taking your medications? No   Do you need help managing money? No   Do you ever drive or ride in a car without wearing a seat belt? No   Have you felt unusual stress, anger or loneliness in the last month? Yes   Who do you live with? Child   If you need help, do you have trouble finding someone available to you? No   Have you been bothered in the last four weeks by sexual problems? No   Do you have difficulty concentrating, remembering or making decisions? Yes       Fall Risk Screen:  NICOLLE Fall Risk Assessment was completed, and patient is at HIGH risk for falls. Assessment completed on:10/7/2019    ACE III MINI        Does the patient have evidence of cognitive impairment? No    Aspirin use counseling: Does not need ASA (and currently is not on it)    Recent Lab Results:  CMP:  Lab Results   Component Value Date    BUN 8 10/03/2019    CREATININE 0.86 10/03/2019    EGFRIFNONA 66 10/03/2019    BCR 9.3 10/03/2019     (H) 10/03/2019    K 5.5 (H) 10/03/2019    CO2 26.4 10/03/2019    CALCIUM 9.4 10/03/2019    ALBUMIN 4.40 10/03/2019    BILITOT 0.3 10/03/2019    ALKPHOS 100 10/03/2019    AST 21 10/03/2019    ALT 13 10/03/2019     HbA1c:  No results found for: HGBA1C  Microalbumin:  No results found for: MICROALBUR, POCMALB, POCCREAT  Lipid Panel  Lab Results   Component Value Date    CHOL 228 (H)  10/03/2019    TRIG 93 10/03/2019    HDL 59 10/03/2019     (H) 10/03/2019    AST 21 10/03/2019    ALT 13 10/03/2019       Visual Acuity:  No exam data present    Age-appropriate Screening Schedule:  Refer to the list below for future screening recommendations based on patient's age, sex and/or medical conditions. Orders for these recommended tests are listed in the plan section. The patient has been provided with a written plan.    Health Maintenance   Topic Date Due   • TDAP/TD VACCINES (1 - Tdap) 11/10/1971   • ZOSTER VACCINE (1 of 2) 11/10/2002   • PNEUMOCOCCAL VACCINES (65+ LOW/MEDIUM RISK) (1 of 2 - PCV13) 11/10/2017   • INFLUENZA VACCINE  08/01/2019   • MAMMOGRAM  12/28/2020   • COLONOSCOPY  12/10/2028        Subjective   History of Present Illness    Aliyah Mayfield is a 68 y.o. female who presents for a Subsequent Wellness Visit.She has good get up and go and good recall. She has reactive severe depression with loss of her . 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 She declined meds for now and will seek Mandaeism support. Counseled patient regarding multimodal approach with healthy nutrition, healthy sleep, regular physical activity, social activities, and meditation. Encouraged her to get an eye exam within the next 6 months and she agreed. She has had a dexa, mammogram and colonoscopy all last years. She is currently not taking any medications. Her memory is overall ok and she has good get up and go.     CHRONIC CONDITIONS    The following portions of the patient's history were reviewed and updated as appropriate: problem list.    Outpatient Medications Prior to Visit   Medication Sig Dispense Refill   • albuterol sulfate  (90 Base) MCG/ACT inhaler Inhale 2 puffs Every 4 (Four) Hours As Needed for Wheezing or Shortness of Air. 1 inhaler 0   • azithromycin (ZITHROMAX Z-LIAM) 250 MG tablet Take 2 tablets the first day, then 1 tablet daily for 4  days. 6 tablet 0     No facility-administered medications prior to visit.        Patient Active Problem List   Diagnosis   • Sprain of lateral collateral ligament of right knee   • Sprain of anterior talofibular ligament of right ankle   • Acute gastric ulcer without hemorrhage or perforation   • Diverticulosis of large intestine   • Essential (primary) hypertension   • Migraine with aura and without status migrainosus, not intractable   • Low back pain at multiple sites   • Mild major depression (CMS/HCC)   • Menopausal and perimenopausal disorder   • Vertigo   • Syncope and collapse   • Cervicalgia   • Diverticulosis of large intestine without hemorrhage   • Environmental allergies   • Aphthous ulcer of mouth   • Overweight (BMI 25.0-29.9)   • Medicare annual wellness visit, initial       Advance Care Planning:  Patient does not have an advance directive - information provided to the patient today    Identification of Risk Factors:  Risk factors include: Advance Directive Discussion  Depression/Dysphoria  Fall Risk  Hearing Problem.    Review of Systems   Constitutional: Negative for chills and fever.   HENT: Positive for hearing loss.    Respiratory: Negative for cough and shortness of breath.    Cardiovascular: Negative for chest pain and palpitations.   Gastrointestinal: Negative for abdominal pain, nausea and vomiting.   Musculoskeletal: Negative for gait problem.   Skin: Negative for rash.   Neurological: Negative for dizziness, light-headedness and headaches.   Hematological: Negative for adenopathy.   Psychiatric/Behavioral: Positive for dysphoric mood and sleep disturbance. Negative for suicidal ideas. The patient is nervous/anxious.    All other systems reviewed and are negative.      Compared to one year ago, the patient feels her physical health is the same.  Compared to one year ago, the patient feels her mental health is worse.    Objective     Physical Exam   Constitutional: She is oriented to  "person, place, and time. She appears well-developed and well-nourished.   HENT:   Head: Normocephalic and atraumatic.   Right Ear: External ear normal.   Left Ear: External ear normal.   Mouth/Throat: Oropharynx is clear and moist.   Eyes: Conjunctivae and EOM are normal.   Neck: Normal range of motion. Neck supple.   Cardiovascular: Normal rate, regular rhythm and normal heart sounds.   Pulmonary/Chest: Effort normal and breath sounds normal. She has no wheezes. She has no rales.   Abdominal: Soft. Bowel sounds are normal.   Musculoskeletal: Normal range of motion.   Neurological: She is alert and oriented to person, place, and time.   Good get up and go and good recall 3 of 3   Skin: Skin is warm and dry.   Psychiatric: She has a normal mood and affect. Her behavior is normal. Thought content normal.        Procedures     Vitals:    10/07/19 1119   BP: 116/80   Pulse: 68   Weight: 68.9 kg (152 lb)   Height: 160 cm (62.99\")   PainSc: 0-No pain       Patient's Body mass index is 26.93 kg/m². BMI is above normal parameters. Recommendations include: educational material, exercise counseling and nutrition counseling.      Assessment/Plan   Problem List Items Addressed This Visit        Cardiovascular and Mediastinum    Essential (primary) hypertension    Current Assessment & Plan     Hypertension is improving with lifestyle modifications.  Dietary sodium restriction.  Weight loss.  Regular aerobic exercise.  Blood pressure will be reassessed at the next regular appointment.            Other    Mild major depression (CMS/HCC)    Current Assessment & Plan     Psychological condition is worsening.  Regular aerobic exercise.  Psychological condition  will be reassessed at the next regular appointment Counseled patient regarding multimodal approach with healthy nutrition, healthy sleep, regular physical activity, social activities, and meditation.  .         Overweight (BMI 25.0-29.9)    Current Assessment & Plan     " Obesity is improving with lifestyle modifications.  Discussed the patient's BMI.  The BMI is above average; BMI management plan is completed.  General weight loss/lifestyle modification strategies discussed (elicit support from others; identify saboteurs; non-food rewards, etc).She was losing weight prior to her loss of her  and with appetite suppression she is getting adequate protein and losing weight.          Medicare annual wellness visit, initial - Primary    Current Assessment & Plan     She has good get up and go and good recall. She has reactive severe depression with loss of her . 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 She declined meds for now and will seek Nondenominational support. Counseled patient regarding multimodal approach with healthy nutrition, healthy sleep, regular physical activity, social activities, and meditation. Encouraged her to get an eye exam within the next 6 months and she agreed. She has had a dexa, mammogram and colonoscopy all last years. She is currently not taking any medications. Her memory is overall ok and she has good get up and go. She has hearing aides and they are overall doing ok.   Age-appropriate Counseling:  Discussed preventative medicine issues with patient including regular exercise, healthy diet, stress reduction, adequate sleep and recommended age-appropriate screening studies.  Immunizations reviewed.                Other Visit Diagnoses     Weight loss        Multifactorial and encouraged to make sure gets protper nutrtion. Labs were ok.     Hyperkalemia        Acute issue and will repeat labs.     Relevant Orders    Basic Metabolic Panel    Need for vaccination        Relevant Orders    Pneumococcal Conjugate Vaccine 13-Valent All (Completed)    Exposure to hepatitis C        Relevant Orders    Hepatitis C Antibody        Patient Self-Management and Personalized Health Advice  The patient has been  provided with information about: diet, exercise, prevention of cardiac or vascular disease and mental health concerns and preventive services including:   · Annual Wellness Visit (AWV)  · Depression Screening (15 minutes face to face, Code )  · Glaucoma screening (for individuals with diabetes mellitus, family history of glaucoma, -Americans (> or =) age 50, -Americans (> or =) age 65).    Outpatient Encounter Medications as of 10/7/2019   Medication Sig Dispense Refill   • [DISCONTINUED] albuterol sulfate  (90 Base) MCG/ACT inhaler Inhale 2 puffs Every 4 (Four) Hours As Needed for Wheezing or Shortness of Air. 1 inhaler 0   • [DISCONTINUED] azithromycin (ZITHROMAX Z-LIAM) 250 MG tablet Take 2 tablets the first day, then 1 tablet daily for 4 days. 6 tablet 0     No facility-administered encounter medications on file as of 10/7/2019.        Reviewed use of high risk medication in the elderly: no  Reviewed for potential of harmful drug interactions in the elderly: no    Follow Up:  Return in about 6 months (around 4/7/2020).     An After Visit Summary and PPPS with all of these plans were given to the patient.

## 2019-10-07 NOTE — ASSESSMENT & PLAN NOTE
She has good get up and go and good recall. She has reactive severe depression with loss of her . 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 Counseled patient regarding multimodal approach with healthy nutrition, healthy sleep, regular physical activity, social activities, and meditation. Encouraged her to get an eye exam within the next 6 months and she agreed. She has had a dexa, mammogram and colonoscopy all last years. She is currently not taking any medications. Her memory is overall ok and she has good get up and go. She has hearing aides and they are overall doing ok.   Age-appropriate Counseling:  Discussed preventative medicine issues with patient including regular exercise, healthy diet, stress reduction, adequate sleep and recommended age-appropriate screening studies.  Immunizations reviewed.

## 2019-10-08 NOTE — ASSESSMENT & PLAN NOTE
Obesity is improving with lifestyle modifications.  Discussed the patient's BMI.  The BMI is above average; BMI management plan is completed.  General weight loss/lifestyle modification strategies discussed (elicit support from others; identify saboteurs; non-food rewards, etc).She was losing weight prior to her loss of her  and with appetite suppression she is getting adequate protein and losing weight.

## 2019-10-08 NOTE — ASSESSMENT & PLAN NOTE
Psychological condition is worsening.  Regular aerobic exercise.  Psychological condition  will be reassessed at the next regular appointment Counseled patient regarding multimodal approach with healthy nutrition, healthy sleep, regular physical activity, social activities, and meditation.  .

## 2019-10-08 NOTE — ASSESSMENT & PLAN NOTE
Hypertension is improving with lifestyle modifications.  Dietary sodium restriction.  Weight loss.  Regular aerobic exercise.  Blood pressure will be reassessed at the next regular appointment.

## 2020-01-28 ENCOUNTER — HOSPITAL ENCOUNTER (OUTPATIENT)
Dept: GENERAL RADIOLOGY | Facility: HOSPITAL | Age: 70
Discharge: HOME | End: 2020-01-28
Admitting: INTERNAL MEDICINE

## 2020-01-28 ENCOUNTER — OFFICE VISIT (OUTPATIENT)
Dept: INTERNAL MEDICINE | Facility: CLINIC | Age: 70
End: 2020-01-28

## 2020-01-28 VITALS
HEIGHT: 63 IN | SYSTOLIC BLOOD PRESSURE: 132 MMHG | BODY MASS INDEX: 26.22 KG/M2 | WEIGHT: 148 LBS | DIASTOLIC BLOOD PRESSURE: 72 MMHG | TEMPERATURE: 97.3 F | HEART RATE: 70 BPM

## 2020-01-28 DIAGNOSIS — M54.2 CERVICALGIA: Primary | ICD-10-CM

## 2020-01-28 DIAGNOSIS — M54.2 CERVICALGIA: ICD-10-CM

## 2020-01-28 DIAGNOSIS — I10 ESSENTIAL (PRIMARY) HYPERTENSION: ICD-10-CM

## 2020-01-28 DIAGNOSIS — F32.0 MILD MAJOR DEPRESSION (HCC): ICD-10-CM

## 2020-01-28 DIAGNOSIS — M54.6 ACUTE LEFT-SIDED THORACIC BACK PAIN: ICD-10-CM

## 2020-01-28 DIAGNOSIS — M25.512 ACUTE PAIN OF LEFT SHOULDER: ICD-10-CM

## 2020-01-28 PROCEDURE — 72050 X-RAY EXAM NECK SPINE 4/5VWS: CPT

## 2020-01-28 PROCEDURE — 99214 OFFICE O/P EST MOD 30 MIN: CPT | Performed by: INTERNAL MEDICINE

## 2020-01-28 RX ORDER — TIZANIDINE 4 MG/1
4 TABLET ORAL NIGHTLY PRN
Qty: 45 TABLET | Refills: 0 | Status: SHIPPED | OUTPATIENT
Start: 2020-01-28 | End: 2020-08-04

## 2020-01-28 RX ORDER — ESTRADIOL 0.5 MG/.5G
1 GEL TOPICAL 3 TIMES WEEKLY
COMMUNITY
End: 2021-07-08

## 2020-01-28 RX ORDER — CELECOXIB 200 MG/1
200 CAPSULE ORAL DAILY
Qty: 30 CAPSULE | Refills: 1 | Status: SHIPPED | OUTPATIENT
Start: 2020-01-28 | End: 2020-05-14 | Stop reason: ALTCHOICE

## 2020-01-28 NOTE — ASSESSMENT & PLAN NOTE
Psychological condition is unchanged.  Regular aerobic exercise.  Psychological condition  will be reassessed at the next regular appointment Counseled patient regarding multimodal approach with healthy nutrition, healthy sleep, regular physical activity, social activities, and meditation.  Do [facility] [employer] and consider cymbalta in March as will be more than 6 months since loss of her . If gets a suicidal plan will immediately seek help. .

## 2020-01-28 NOTE — PROGRESS NOTES
"Chief Complaint   Patient presents with   • neck stiffness     tried advil and salon pas       History of Present Illness  69 y.o. white female  presents for acute issue of neck pain and reduce range of motion. This time unlike previous times the advil and salon pas is not helping much. She does have some pain left side of neck and left upper back pain and left shoulder pain. She has mild ache down the arm less than back and neck. She has mild numbness of the left arm.    Review of Systems   Constitutional: Positive for fatigue. Negative for diaphoresis.   Respiratory: Negative for cough and shortness of breath.    Cardiovascular: Negative for chest pain and palpitations.   Gastrointestinal: Positive for diarrhea (some days of diarrhea). Negative for abdominal pain.   Musculoskeletal: Positive for arthralgias (left shoulder pain), back pain, neck pain and neck stiffness.   Neurological: Positive for numbness.   Psychiatric/Behavioral: Positive for dysphoric mood and sleep disturbance. The patient is nervous/anxious.      All other ROS reviewed and negative.    PMSFH:  The following portions of the patient's history were reviewed and updated as appropriate: allergies, current medications, past family history, past medical history, past social history, past surgical history and problem list.      Current Outpatient Medications:   •  estradiol 0.5 MG/0.5GM gel, Place  on the skin as directed by provider., Disp: , Rfl:   •  celecoxib (CeleBREX) 200 MG capsule, Take 1 capsule by mouth Daily., Disp: 30 capsule, Rfl: 1  •  tiZANidine (ZANAFLEX) 4 MG tablet, Take 1 tablet by mouth At Night As Needed for Muscle Spasms (neck pain). 1 to 2 tab or cap at bedtime, Disp: 45 tablet, Rfl: 0    VITALS:  /72 (BP Location: Left arm)   Pulse 70   Temp 97.3 °F (36.3 °C)   Ht 160 cm (62.99\")   Wt 67.1 kg (148 lb)   BMI 26.22 kg/m²     Physical Exam   Constitutional: She is oriented to person, place, and time.   Somewhat " tearful but does smile and act appropriately.    Cardiovascular: Normal rate and regular rhythm.   Pulmonary/Chest: Effort normal and breath sounds normal.   Abdominal: Soft. Bowel sounds are normal.   Musculoskeletal: She exhibits tenderness (left side neck pain and left upper back pain and left shoulder pain with moderate reduce range of motion).   Neurological: She is alert and oriented to person, place, and time.   Skin: Skin is warm.   Psychiatric: She has a normal mood and affect. Her behavior is normal. Judgment and thought content normal.       LABS:  Results for orders placed or performed in visit on 10/03/19   Comprehensive Metabolic Panel   Result Value Ref Range    Glucose 93 65 - 99 mg/dL    BUN 8 8 - 23 mg/dL    Creatinine 0.86 0.57 - 1.00 mg/dL    Sodium 146 (H) 136 - 145 mmol/L    Potassium 5.5 (H) 3.5 - 5.2 mmol/L    Chloride 109 (H) 98 - 107 mmol/L    CO2 26.4 22.0 - 29.0 mmol/L    Calcium 9.4 8.6 - 10.5 mg/dL    Total Protein 7.2 6.0 - 8.5 g/dL    Albumin 4.40 3.50 - 5.20 g/dL    ALT (SGPT) 13 1 - 33 U/L    AST (SGOT) 21 1 - 32 U/L    Alkaline Phosphatase 100 39 - 117 U/L    Total Bilirubin 0.3 0.2 - 1.2 mg/dL    eGFR Non African Amer 66 >60 mL/min/1.73    Globulin 2.8 gm/dL    A/G Ratio 1.6 g/dL    BUN/Creatinine Ratio 9.3 7.0 - 25.0    Anion Gap 10.6 5.0 - 15.0 mmol/L   Lipid Panel   Result Value Ref Range    Total Cholesterol 228 (H) 0 - 200 mg/dL    Triglycerides 93 0 - 150 mg/dL    HDL Cholesterol 59 40 - 60 mg/dL    LDL Cholesterol  150 (H) 0 - 100 mg/dL    VLDL Cholesterol 18.6 5 - 40 mg/dL    LDL/HDL Ratio 2.55    TSH Rfx On Abnormal To Free T4   Result Value Ref Range    TSH 2.570 0.270 - 4.200 uIU/mL       Procedures         ASSESSMENT/PLAN:  Problem List Items Addressed This Visit        Cardiovascular and Mediastinum    Essential (primary) hypertension     Hypertension is unchanged.  Dietary sodium restriction.  Regular aerobic exercise.  Blood pressure will be reassessed at the next  regular appointment.            Nervous and Auditory    Cervicalgia - Primary     Acutely worse and will proceed with PT and medications and an xray. 2/7/2020 addendum she has peristent pain and has done 4 sessions of PT. Proceed with MRI of neck          Relevant Medications    tiZANidine (ZANAFLEX) 4 MG tablet    celecoxib (CeleBREX) 200 MG capsule    Other Relevant Orders    XR Spine Cervical Complete 4 or 5 View (Completed)    MRI Cervical Spine Without Contrast       Other    Mild major depression (CMS/HCC)     Psychological condition is unchanged.  Regular aerobic exercise.  Psychological condition  will be reassessed at the next regular appointment Counseled patient regarding multimodal approach with healthy nutrition, healthy sleep, regular physical activity, social activities, and meditation.  Do [employer] and consider cymbalta in March as will be more than 6 months since loss of her . If gets a suicidal plan will immediately seek help. .           Other Visit Diagnoses     Acute pain of left shoulder        Proceed with PT and then likely xray    Relevant Medications    celecoxib (CeleBREX) 200 MG capsule    Acute left-sided thoracic back pain        Mild and proceed with PT and muscle relaxer.     Relevant Medications    tiZANidine (ZANAFLEX) 4 MG tablet    celecoxib (CeleBREX) 200 MG capsule          FOLLOW-UP:  Return for Next scheduled follow up.      Electronically signed by:    Rick Carolina MD

## 2020-01-28 NOTE — ASSESSMENT & PLAN NOTE
Acutely worse and will proceed with PT and medications and an xray. 2/7/2020 addendum she has peristent pain and has done 4 sessions of PT. Proceed with MRI of neck

## 2020-01-28 NOTE — ASSESSMENT & PLAN NOTE
Hypertension is unchanged.  Dietary sodium restriction.  Regular aerobic exercise.  Blood pressure will be reassessed at the next regular appointment.

## 2020-01-29 ENCOUNTER — TELEPHONE (OUTPATIENT)
Dept: INTERNAL MEDICINE | Facility: CLINIC | Age: 70
End: 2020-01-29

## 2020-01-29 NOTE — TELEPHONE ENCOUNTER
Saw Dr. Carolina yesterday and Celebrex was sent in for patient. However, her pharmacy said it needs a PA. Patient would like to know if a PA can be done or if something else can be prescribed for her? Please call patient to advise. Pt stated it is ok to leave a complete message on the [phone] number.

## 2020-02-07 ENCOUNTER — TELEPHONE (OUTPATIENT)
Dept: INTERNAL MEDICINE | Facility: CLINIC | Age: 70
End: 2020-02-07

## 2020-02-07 NOTE — TELEPHONE ENCOUNTER
Patient was calling to update Dr Carolina physical therapist as well as the patient believe it is time to do an MRI on the left arm and neck. Patient stated she has completed 4 sessions of therapy so far. Patient stated she also received the letter yesterday from Dr Carolina stating that he advised after the recent x-rays if the patient wanted to proceed with the MRI referral please call patient with any questions or concerns. [phone]

## 2020-02-11 ENCOUNTER — HOSPITAL ENCOUNTER (OUTPATIENT)
Dept: MRI IMAGING | Facility: HOSPITAL | Age: 70
Discharge: HOME | End: 2020-02-11
Admitting: INTERNAL MEDICINE

## 2020-02-11 DIAGNOSIS — M54.2 CERVICALGIA: ICD-10-CM

## 2020-02-11 PROCEDURE — 72141 MRI NECK SPINE W/O DYE: CPT

## 2020-02-17 ENCOUNTER — TELEPHONE (OUTPATIENT)
Dept: INTERNAL MEDICINE | Facility: CLINIC | Age: 70
End: 2020-02-17

## 2020-02-17 NOTE — TELEPHONE ENCOUNTER
Pt notified and wants to know how long she should try PT before considering the surgeon or pain doctor?

## 2020-02-17 NOTE — TELEPHONE ENCOUNTER
It showed arthritic changes and some narrowing of the spine and if not doing better would need to consider seeing Neuro surgeon or pain doctor

## 2020-02-18 NOTE — TELEPHONE ENCOUNTER
Patient called and info was given to patient per Dr. Carolina.  Patient understood and verbalized understanding.

## 2020-03-26 ENCOUNTER — TELEMEDICINE (OUTPATIENT)
Dept: INTERNAL MEDICINE | Facility: CLINIC | Age: 70
End: 2020-03-26

## 2020-03-26 VITALS — HEART RATE: 67 BPM | DIASTOLIC BLOOD PRESSURE: 77 MMHG | SYSTOLIC BLOOD PRESSURE: 187 MMHG

## 2020-03-26 DIAGNOSIS — G43.109 MIGRAINE WITH AURA AND WITHOUT STATUS MIGRAINOSUS, NOT INTRACTABLE: ICD-10-CM

## 2020-03-26 DIAGNOSIS — R21 RASH: ICD-10-CM

## 2020-03-26 DIAGNOSIS — R42 VERTIGO: Primary | ICD-10-CM

## 2020-03-26 DIAGNOSIS — I10 ESSENTIAL (PRIMARY) HYPERTENSION: ICD-10-CM

## 2020-03-26 DIAGNOSIS — F51.04 PSYCHOPHYSIOLOGICAL INSOMNIA: ICD-10-CM

## 2020-03-26 DIAGNOSIS — F32.0 MILD MAJOR DEPRESSION (HCC): ICD-10-CM

## 2020-03-26 PROCEDURE — 99214 OFFICE O/P EST MOD 30 MIN: CPT | Performed by: INTERNAL MEDICINE

## 2020-03-26 RX ORDER — AMLODIPINE BESYLATE 2.5 MG/1
2.5 TABLET ORAL DAILY
Qty: 30 TABLET | Refills: 5 | Status: SHIPPED | OUTPATIENT
Start: 2020-03-26 | End: 2020-10-13

## 2020-03-26 NOTE — ASSESSMENT & PLAN NOTE
Hypertension is worsening.  Dietary sodium restriction.  Regular aerobic exercise.  Medication changes per orders.  Blood pressure will be reassessed at the next regular appointmentAdd amlodipine 2.5 mg per day for systolic >170.

## 2020-03-26 NOTE — ASSESSMENT & PLAN NOTE
Psychological condition is worsening.  Regular aerobic exercise.  Psychological condition  will be reassessed at the next regular appointment Counseled patient regarding multimodal approach with healthy nutrition, healthy sleep, regular physical activity, social activities, and meditation.  She declined medications right now and is getting help staying with her friend.

## 2020-03-26 NOTE — ASSESSMENT & PLAN NOTE
Headaches are worsening.  Counseled regarding lifestyle modifications.  Medication changes per orders.Use the amlodipine as needed for mild headaches if blood pressure up. Consider lisinopril and if worsen call

## 2020-03-26 NOTE — PROGRESS NOTES
Chief Complaint   Patient presents with   • Dizziness   • Depression   • Headache   • Hypertension   • Rash   I discussed with her about coming in to discuss her rash and anxiety and depression but she elected to do video and said yes to authorizing the video visit    History of Present Illness  69 y.o. white female presents for follow up on vertigo. She has some spells of brief dizziness with movement and relieve by rest. She has had some elevated Blood pressure in the 180's with mild headache. It improved with both with deep breathing. She has mild rash on face. She has anxiety and depression and trouble sleeping.     Review of Systems   Constitutional: Negative for chills, diaphoresis and fever.   HENT: Positive for hearing loss.    Eyes: Negative for visual disturbance.   Respiratory: Negative for cough, shortness of breath and wheezing.    Cardiovascular: Negative for chest pain and palpitations.   Gastrointestinal: Negative for abdominal pain.   Musculoskeletal: Positive for neck pain (improved ).   Skin: Positive for rash.   Neurological: Positive for dizziness and headaches.   Hematological: Negative for adenopathy.   Psychiatric/Behavioral: Positive for dysphoric mood and sleep disturbance. Negative for suicidal ideas. The patient is nervous/anxious.      All other ROS reviewed and negative.    PMSFH:  The following portions of the patient's history were reviewed and updated as appropriate: allergies, current medications, past family history, past medical history, past social history, past surgical history and problem list.      Current Outpatient Medications:   •  celecoxib (CeleBREX) 200 MG capsule, Take 1 capsule by mouth Daily., Disp: 30 capsule, Rfl: 1  •  estradiol 0.5 MG/0.5GM gel, Place  on the skin as directed by provider., Disp: , Rfl:   •  tiZANidine (ZANAFLEX) 4 MG tablet, Take 1 tablet by mouth At Night As Needed for Muscle Spasms (neck pain). 1 to 2 tab or cap at bedtime, Disp: 45 tablet, Rfl:  0  •  amLODIPine (NORVASC) 2.5 MG tablet, Take 1 tablet by mouth Daily. Take daily if systolic >170, Disp: 30 tablet, Rfl: 5    VITALS:  BP (!) 187/77 (BP Location: Left arm)   Pulse 67     Physical Exam   Constitutional: She is oriented to person, place, and time. She appears well-developed and well-nourished.   HENT:   Head: Normocephalic.   Neurological: She is alert and oriented to person, place, and time.   Skin:   Mild erythema plaque nose   Psychiatric: She has a normal mood and affect. Her behavior is normal.       LABS:  Results for orders placed or performed in visit on 10/03/19   Comprehensive Metabolic Panel   Result Value Ref Range    Glucose 93 65 - 99 mg/dL    BUN 8 8 - 23 mg/dL    Creatinine 0.86 0.57 - 1.00 mg/dL    Sodium 146 (H) 136 - 145 mmol/L    Potassium 5.5 (H) 3.5 - 5.2 mmol/L    Chloride 109 (H) 98 - 107 mmol/L    CO2 26.4 22.0 - 29.0 mmol/L    Calcium 9.4 8.6 - 10.5 mg/dL    Total Protein 7.2 6.0 - 8.5 g/dL    Albumin 4.40 3.50 - 5.20 g/dL    ALT (SGPT) 13 1 - 33 U/L    AST (SGOT) 21 1 - 32 U/L    Alkaline Phosphatase 100 39 - 117 U/L    Total Bilirubin 0.3 0.2 - 1.2 mg/dL    eGFR Non African Amer 66 >60 mL/min/1.73    Globulin 2.8 gm/dL    A/G Ratio 1.6 g/dL    BUN/Creatinine Ratio 9.3 7.0 - 25.0    Anion Gap 10.6 5.0 - 15.0 mmol/L   Lipid Panel   Result Value Ref Range    Total Cholesterol 228 (H) 0 - 200 mg/dL    Triglycerides 93 0 - 150 mg/dL    HDL Cholesterol 59 40 - 60 mg/dL    LDL Cholesterol  150 (H) 0 - 100 mg/dL    VLDL Cholesterol 18.6 5 - 40 mg/dL    LDL/HDL Ratio 2.55    TSH Rfx On Abnormal To Free T4   Result Value Ref Range    TSH 2.570 0.270 - 4.200 uIU/mL       Procedures         ASSESSMENT/PLAN:  Problem List Items Addressed This Visit        Cardiovascular and Mediastinum    Essential (primary) hypertension     Hypertension is worsening.  Dietary sodium restriction.  Regular aerobic exercise.  Medication changes per orders.  Blood pressure will be reassessed at the  next regular appointmentAdd amlodipine 2.5 mg per day for systolic >170.         Relevant Medications    amLODIPine (NORVASC) 2.5 MG tablet    Other Relevant Orders    Ambulatory Referral to Dermatology (Completed)    Migraine with aura and without status migrainosus, not intractable     Headaches are worsening.  Counseled regarding lifestyle modifications.  Medication changes per orders.Use the amlodipine as needed for mild headaches if blood pressure up. Consider lisinopril and if worsen call             Relevant Medications    amLODIPine (NORVASC) 2.5 MG tablet       Other    Mild major depression (CMS/HCC)     Psychological condition is worsening.  Regular aerobic exercise.  Psychological condition  will be reassessed at the next regular appointment Counseled patient regarding multimodal approach with healthy nutrition, healthy sleep, regular physical activity, social activities, and meditation.  She declined medications right now and is getting help staying with her friend.         Vertigo - Primary     Do the EPPLEY and if not improved will refer to ENT           Other Visit Diagnoses     Rash        Refer to Derm for May 2020 concern basal cell cancer    Psychophysiological insomnia        Plan to do deep breathing and add tryptophan 500 to 1000 mg in the evening and melatonin 5 mg           FOLLOW-UP:  No follow-ups on file.      Electronically signed by:    Rick Carolina MD

## 2020-05-14 ENCOUNTER — OFFICE VISIT (OUTPATIENT)
Dept: INTERNAL MEDICINE | Facility: CLINIC | Age: 70
End: 2020-05-14

## 2020-05-14 VITALS
SYSTOLIC BLOOD PRESSURE: 136 MMHG | HEIGHT: 63 IN | HEART RATE: 74 BPM | TEMPERATURE: 98.4 F | BODY MASS INDEX: 24.41 KG/M2 | DIASTOLIC BLOOD PRESSURE: 84 MMHG | WEIGHT: 137.8 LBS

## 2020-05-14 DIAGNOSIS — M25.512 ACUTE PAIN OF LEFT SHOULDER: ICD-10-CM

## 2020-05-14 DIAGNOSIS — F32.0 MILD MAJOR DEPRESSION (HCC): ICD-10-CM

## 2020-05-14 DIAGNOSIS — I10 ESSENTIAL (PRIMARY) HYPERTENSION: Primary | ICD-10-CM

## 2020-05-14 DIAGNOSIS — M54.2 CERVICALGIA: ICD-10-CM

## 2020-05-14 DIAGNOSIS — R21 RASH: ICD-10-CM

## 2020-05-14 DIAGNOSIS — M54.6 ACUTE LEFT-SIDED THORACIC BACK PAIN: ICD-10-CM

## 2020-05-14 DIAGNOSIS — R42 VERTIGO: ICD-10-CM

## 2020-05-14 PROCEDURE — 99214 OFFICE O/P EST MOD 30 MIN: CPT | Performed by: INTERNAL MEDICINE

## 2020-05-14 RX ORDER — CELECOXIB 200 MG/1
200 CAPSULE ORAL DAILY PRN
Qty: 30 CAPSULE | Refills: 1
Start: 2020-05-14 | End: 2020-10-13

## 2020-05-14 NOTE — PROGRESS NOTES
"Chief Complaint   Patient presents with   • Hypertension     follow up       History of Present Illness  69 y.o. white female presents for follow up of long standing HTN and depression. She has been overall ok with HTN. She denies associated chest pain or headaches. It worsens sometimes with stress. She has had increased anxiety and depression over the past week with her son law suit over her 's will.     Review of Systems   Constitutional: Negative for chills and fever.   HENT: Positive for postnasal drip. Negative for sore throat.    Respiratory: Negative for cough, shortness of breath and wheezing.    Cardiovascular: Negative for chest pain.   Gastrointestinal: Negative for abdominal pain.   Musculoskeletal: Positive for neck pain.   Neurological: Positive for dizziness. Negative for headaches.   Psychiatric/Behavioral: Positive for dysphoric mood and sleep disturbance. Negative for suicidal ideas. The patient is nervous/anxious.      All other ROS reviewed and negative.    PMSFH:  The following portions of the patient's history were reviewed and updated as appropriate: allergies, current medications, past family history, past medical history, past social history, past surgical history and problem list.      Current Outpatient Medications:   •  amLODIPine (NORVASC) 2.5 MG tablet, Take 1 tablet by mouth Daily. Take daily if systolic >170, Disp: 30 tablet, Rfl: 5  •  estradiol 0.5 MG/0.5GM gel, Place  on the skin as directed by provider., Disp: , Rfl:   •  tiZANidine (ZANAFLEX) 4 MG tablet, Take 1 tablet by mouth At Night As Needed for Muscle Spasms (neck pain). 1 to 2 tab or cap at bedtime, Disp: 45 tablet, Rfl: 0  •  celecoxib (CeleBREX) 200 MG capsule, Take 1 capsule by mouth Daily As Needed for Moderate Pain  (neck pain)., Disp: 30 capsule, Rfl: 1    VITALS:  /84 (BP Location: Left arm, Patient Position: Sitting, Cuff Size: Adult)   Pulse 74   Temp 98.4 °F (36.9 °C)   Ht 160 cm (62.99\")   Wt " 62.5 kg (137 lb 12.8 oz)   BMI 24.42 kg/m²     Physical Exam   Constitutional: She is oriented to person, place, and time. She appears well-developed and well-nourished.   HENT:   Head: Normocephalic.   Decreased hearing    Eyes: Conjunctivae and EOM are normal.   Neck: No JVD present.   Cardiovascular: Normal rate, regular rhythm and normal heart sounds.   Pulmonary/Chest: Effort normal and breath sounds normal.   Abdominal: Soft. Bowel sounds are normal.   Musculoskeletal: She exhibits tenderness (minimal neck pain with movement ). She exhibits no edema.   Neurological: She is alert and oriented to person, place, and time.   Good get  Up and go and non focal exam.    Skin:   Red scale left side of nose   Psychiatric: She has a normal mood and affect. Her behavior is normal.       LABS:  Results for orders placed or performed in visit on 10/03/19   Comprehensive Metabolic Panel   Result Value Ref Range    Glucose 93 65 - 99 mg/dL    BUN 8 8 - 23 mg/dL    Creatinine 0.86 0.57 - 1.00 mg/dL    Sodium 146 (H) 136 - 145 mmol/L    Potassium 5.5 (H) 3.5 - 5.2 mmol/L    Chloride 109 (H) 98 - 107 mmol/L    CO2 26.4 22.0 - 29.0 mmol/L    Calcium 9.4 8.6 - 10.5 mg/dL    Total Protein 7.2 6.0 - 8.5 g/dL    Albumin 4.40 3.50 - 5.20 g/dL    ALT (SGPT) 13 1 - 33 U/L    AST (SGOT) 21 1 - 32 U/L    Alkaline Phosphatase 100 39 - 117 U/L    Total Bilirubin 0.3 0.2 - 1.2 mg/dL    eGFR Non African Amer 66 >60 mL/min/1.73    Globulin 2.8 gm/dL    A/G Ratio 1.6 g/dL    BUN/Creatinine Ratio 9.3 7.0 - 25.0    Anion Gap 10.6 5.0 - 15.0 mmol/L   Lipid Panel   Result Value Ref Range    Total Cholesterol 228 (H) 0 - 200 mg/dL    Triglycerides 93 0 - 150 mg/dL    HDL Cholesterol 59 40 - 60 mg/dL    LDL Cholesterol  150 (H) 0 - 100 mg/dL    VLDL Cholesterol 18.6 5 - 40 mg/dL    LDL/HDL Ratio 2.55    TSH Rfx On Abnormal To Free T4   Result Value Ref Range    TSH 2.570 0.270 - 4.200 uIU/mL       Procedures         ASSESSMENT/PLAN:  Problem List  Items Addressed This Visit        Cardiovascular and Mediastinum    Essential (primary) hypertension - Primary     Hypertension is unchanged.  Continue current treatment regimen.  Dietary sodium restriction.  Weight loss.  Regular aerobic exercise.  Blood pressure will be reassessed at the next regular appointment.            Nervous and Auditory    Cervicalgia     Use zanaflex as needed. Overall ok. Use celebrex as needed.          Relevant Medications    celecoxib (CeleBREX) 200 MG capsule       Other    Mild major depression (CMS/HCC)     Psychological condition is improving with lifestyle modifications.  Continue current treatment regimen.  Regular aerobic exercise.  Psychological condition  will be reassessed at the next regular appointmentShe was moderately better and now son contesting will so had some set back. Overall improved. .         Vertigo     Overall improved and will stay hydrated and continue the Eppley Maneuver as needed.            Other Visit Diagnoses     Acute pain of left shoulder        Proceed with PT and then likely xray    Relevant Medications    celecoxib (CeleBREX) 200 MG capsule    Acute left-sided thoracic back pain        Mild and proceed with PT and muscle relaxer.     Relevant Medications    celecoxib (CeleBREX) 200 MG capsule    Rash        refer to Derm    Relevant Orders    Ambulatory Referral to Dermatology (Completed)          FOLLOW-UP:  Return in about 5 months (around 10/14/2020) for Medicare Wellness.      Electronically signed by:    Rick Carolina MD          Answers for HPI/ROS submitted by the patient on 5/12/2020   What is the primary reason for your visit?: Other  Please describe your symptoms.: Scheduled Follow-up  Have you had these symptoms before?: No  How long have you been having these symptoms?: Other  Please list any medications you are currently taking for this condition.: N/a  Please describe any probable cause for these symptoms. : N/a

## 2020-05-14 NOTE — ASSESSMENT & PLAN NOTE
Psychological condition is improving with lifestyle modifications.  Continue current treatment regimen.  Regular aerobic exercise.  Psychological condition  will be reassessed at the next regular appointmentShe was moderately better and now son contesting will so had some set back. Overall improved. .

## 2020-06-18 ENCOUNTER — TELEPHONE (OUTPATIENT)
Dept: INTERNAL MEDICINE | Facility: CLINIC | Age: 70
End: 2020-06-18

## 2020-06-18 NOTE — TELEPHONE ENCOUNTER
You ordered a BMP and Hep C Antibody on 10/7/19 and pt never had them done. She has been seen multiple times since then but labs haven't been performed. Can we cancel?

## 2020-08-04 ENCOUNTER — OFFICE VISIT (OUTPATIENT)
Dept: INTERNAL MEDICINE | Facility: CLINIC | Age: 70
End: 2020-08-04

## 2020-08-04 VITALS
HEIGHT: 63 IN | SYSTOLIC BLOOD PRESSURE: 122 MMHG | HEART RATE: 68 BPM | TEMPERATURE: 97.1 F | WEIGHT: 132 LBS | BODY MASS INDEX: 23.39 KG/M2 | DIASTOLIC BLOOD PRESSURE: 74 MMHG

## 2020-08-04 DIAGNOSIS — R21 RASH: ICD-10-CM

## 2020-08-04 DIAGNOSIS — F41.9 ANXIETY: Primary | ICD-10-CM

## 2020-08-04 PROCEDURE — 99213 OFFICE O/P EST LOW 20 MIN: CPT | Performed by: NURSE PRACTITIONER

## 2020-08-04 RX ORDER — HYDROXYZINE HYDROCHLORIDE 25 MG/1
25 TABLET, FILM COATED ORAL 3 TIMES DAILY PRN
Qty: 30 TABLET | Refills: 1 | Status: SHIPPED | OUTPATIENT
Start: 2020-08-04 | End: 2020-10-13

## 2020-08-04 NOTE — PROGRESS NOTES
Dalia Mayfield  1950  [id number]  Patient Care Team:  Rick Carolina MD as PCP - General (Internal Medicine)    Dalia Mayfield is a pleasant 69 y.o. female who presents for evaluation of Rash (chest)      Chief Complaint   Patient presents with   • Rash     chest       HPI:   Rash today anterior breasts, light pink, some itching,  Context severe anxiety, seeing counselor, has declined meds to this point, thinks she may need to reconsider as stressors increase.  Past Medical History:   Diagnosis Date   • Acute gastric ulcer without hemorrhage or perforation 2019   • Ankle fracture    • Carpal tunnel syndrome    • Diverticulosis    • Hearing loss    • Hypertension    • Lumbar back pain    • Situational depression    • Syncope and collapse    • Wrist fracture      Past Surgical History:   Procedure Laterality Date   • BREAST EXCISIONAL BIOPSY      Age 22   • CARPAL TUNNEL RELEASE     • CERVICAL LAMINECTOMY     • CERVICAL LAMINECTOMY     • CHOLECYSTECTOMY     • GASTRIC BYPASS     • HAND SURGERY Right     CMCJ replacement- Dr. Olivares   • JOINT REPLACEMENT      Thumb Joint Replacement   • LUMBAR DISCECTOMY     • TONSILLECTOMY AND ADENOIDECTOMY     • TUBAL ABDOMINAL LIGATION Bilateral    • WRIST SURGERY Right     ORIF     Family History   Problem Relation Age of Onset   • Hypertension Mother    • Heart attack Mother    • Stroke Mother    • Obesity Mother    • Hypertension Father    • Heart attack Father 45   • Cancer Father    • Breast cancer Maternal Aunt    • Heart attack Brother 52   • Ovarian cancer Neg Hx      Social History     Tobacco Use   Smoking Status Former Smoker   • Years: 18.00   • Types: Cigarettes   • Start date:    • Last attempt to quit: 1988   • Years since quittin.0   Smokeless Tobacco Never Used     Allergies   Allergen Reactions   • Codeine Anaphylaxis     rash   • Penicillins Anaphylaxis     Rash       Current Outpatient Medications:   •  amLODIPine  "(NORVASC) 2.5 MG tablet, Take 1 tablet by mouth Daily. Take daily if systolic >170 (Patient taking differently: Take 2.5 mg by mouth Daily As Needed. Take daily if systolic >170), Disp: 30 tablet, Rfl: 5  •  celecoxib (CeleBREX) 200 MG capsule, Take 1 capsule by mouth Daily As Needed for Moderate Pain  (neck pain)., Disp: 30 capsule, Rfl: 1  •  estradiol 0.5 MG/0.5GM gel, Place 1 application on the skin as directed by provider 3 (Three) Times a Week., Disp: , Rfl:   •  hydrOXYzine (ATARAX) 25 MG tablet, Take 1 tablet by mouth 3 (Three) Times a Day As Needed for Itching or Anxiety., Disp: 30 tablet, Rfl: 1    Review of Systems   Constitutional: Negative for chills, fatigue and fever.   HENT: Positive for ear pain. Negative for congestion and sinus pressure.    Respiratory: Negative for cough, chest tightness, shortness of breath and wheezing.    Cardiovascular: Negative for chest pain and palpitations.   Gastrointestinal: Positive for diarrhea. Negative for abdominal pain, blood in stool and constipation.   Skin: Negative for color change.   Allergic/Immunologic: Negative for environmental allergies.   Neurological: Negative for dizziness, speech difficulty and headache.   Psychiatric/Behavioral: Positive for decreased concentration. The patient is nervous/anxious.      /74 (BP Location: Left arm, Patient Position: Sitting, Cuff Size: Adult)   Pulse 68   Temp 97.1 °F (36.2 °C) (Temporal)   Ht 160 cm (62.99\")   Wt 59.9 kg (132 lb)   BMI 23.39 kg/m²     Physical Exam   Constitutional: She appears well-developed and well-nourished.   Pulmonary/Chest: Effort normal.   Neurological: She is alert.   Skin: Skin is warm and dry.   Faint pink nondescript rash bilat breasts   Psychiatric: Her behavior is normal. Judgment and thought content normal. Her mood appears anxious.   Vitals reviewed.      Results Review:  None    Assessment/Plan:  Dalia was seen today for rash.    Diagnoses and all orders for this " visit:    Anxiety  Comments:  trial of vistaril prn for anxiety, we discussed f/u with pcp to evaluate how this impacts anxiety and see if we need to consider other therapy    Rash  Comments:  trying vistaril, call if new or worse sx    Other orders  -     hydrOXYzine (ATARAX) 25 MG tablet; Take 1 tablet by mouth 3 (Three) Times a Day As Needed for Itching or Anxiety.       There are no Patient Instructions on file for this visit.  Plan of care reviewed with patient at the conclusion of today's visit. Education was provided regarding diagnosis, management and any prescribed or recommended OTC medications.  Patient verbalizes understanding of and agreement with management plan.    No follow-ups on file.    *Note that portions of this note were completed with a voice recognition program.  Efforts were made to edit the dictation but occasionally words are transcribed.    LIZ De Anda

## 2020-09-16 ENCOUNTER — FLU SHOT (OUTPATIENT)
Dept: INTERNAL MEDICINE | Facility: CLINIC | Age: 70
End: 2020-09-16

## 2020-09-16 PROCEDURE — G0008 ADMIN INFLUENZA VIRUS VAC: HCPCS | Performed by: INTERNAL MEDICINE

## 2020-09-16 PROCEDURE — 90694 VACC AIIV4 NO PRSRV 0.5ML IM: CPT | Performed by: INTERNAL MEDICINE

## 2020-10-13 ENCOUNTER — OFFICE VISIT (OUTPATIENT)
Dept: INTERNAL MEDICINE | Facility: CLINIC | Age: 70
End: 2020-10-13

## 2020-10-13 VITALS
HEIGHT: 63 IN | BODY MASS INDEX: 23.57 KG/M2 | SYSTOLIC BLOOD PRESSURE: 130 MMHG | HEART RATE: 72 BPM | DIASTOLIC BLOOD PRESSURE: 70 MMHG | WEIGHT: 133 LBS | TEMPERATURE: 96.9 F

## 2020-10-13 DIAGNOSIS — R27.0: ICD-10-CM

## 2020-10-13 DIAGNOSIS — I10 ESSENTIAL (PRIMARY) HYPERTENSION: ICD-10-CM

## 2020-10-13 DIAGNOSIS — Z13.29 SCREENING FOR ENDOCRINE DISORDER: ICD-10-CM

## 2020-10-13 DIAGNOSIS — R29.810 FACIAL DROOP: Primary | ICD-10-CM

## 2020-10-13 DIAGNOSIS — Z13.220 LIPID SCREENING: ICD-10-CM

## 2020-10-13 PROCEDURE — 99213 OFFICE O/P EST LOW 20 MIN: CPT | Performed by: NURSE PRACTITIONER

## 2020-10-13 RX ORDER — TRIAMCINOLONE ACETONIDE 5 MG/G
OINTMENT TOPICAL 2 TIMES DAILY
Qty: 15 G | Refills: 0 | Status: SHIPPED | OUTPATIENT
Start: 2020-10-13 | End: 2021-07-08 | Stop reason: SDUPTHER

## 2020-10-13 NOTE — PROGRESS NOTES
Dalia Mayfield  1950  [id number]  Patient Care Team:  Rick Carolina MD as PCP - General (Internal Medicine)    Dalia Mayfield is a pleasant 69 y.o. female who presents for evaluation of Eye Problem and Lesion    Chief Complaint   Patient presents with   • Eye Problem   • Lesion       HPI:   Pt comes in today for evaluation of a skin lesion RLE, medial.  Appeared 2 days ago, dry, scaly, dark red.      During eval she also wants to address right facial droop she noticed yest am. She was looking in the mirror and first thought eye was puffy.  No bp meds in 2 mo, readings have been normal Previous headaches had resolved.  She denies speech problem including slurred speech, no confusion.  No motor deficit.  No imbalance. She has been under a lot of stress. Poor sleep past 2 nights.  Has chronic intermittent dizziness, not new or different.    Past Medical History:   Diagnosis Date   • Acute gastric ulcer without hemorrhage or perforation 1/7/2019   • Ankle fracture    • Carpal tunnel syndrome    • Diverticulosis    • Hearing loss    • Hypertension    • Lumbar back pain    • Situational depression    • Syncope and collapse    • Wrist fracture      Past Surgical History:   Procedure Laterality Date   • BREAST EXCISIONAL BIOPSY      Age 22   • CARPAL TUNNEL RELEASE     • CERVICAL LAMINECTOMY  2004   • CERVICAL LAMINECTOMY  1995   • CHOLECYSTECTOMY     • GASTRIC BYPASS  2003   • HAND SURGERY Right     CMCJ replacement- Dr. Olivares   • JOINT REPLACEMENT  1995    Thumb Joint Replacement   • LUMBAR DISCECTOMY     • TONSILLECTOMY AND ADENOIDECTOMY     • TUBAL ABDOMINAL LIGATION Bilateral    • WRIST SURGERY Right     ORIF     Family History   Problem Relation Age of Onset   • Hypertension Mother    • Heart attack Mother    • Stroke Mother    • Obesity Mother    • Hypertension Father    • Heart attack Father 45   • Cancer Father    • Breast cancer Maternal Aunt    • Heart attack Brother 52   • Ovarian cancer Neg Hx   "    Social History     Tobacco Use   Smoking Status Former Smoker   • Packs/day: 1.00   • Years: 18.00   • Pack years: 18.00   • Types: Cigarettes   • Start date:    • Quit date: 1988   • Years since quittin.2   Smokeless Tobacco Never Used     Allergies   Allergen Reactions   • Codeine Anaphylaxis     rash   • Penicillins Anaphylaxis     Rash       Current Outpatient Medications:   •  estradiol 0.5 MG/0.5GM gel, Place 1 application on the skin as directed by provider 3 (Three) Times a Week., Disp: , Rfl:   •  triamcinolone (KENALOG) 0.5 % ointment, Apply  topically to the appropriate area as directed 2 (Two) Times a Day., Disp: 15 g, Rfl: 0    Review of Systems   Constitutional: Negative for chills, fatigue and fever.   HENT: Negative for congestion, ear pain and sinus pressure.    Respiratory: Negative for cough, chest tightness, shortness of breath and wheezing.    Cardiovascular: Negative for chest pain and palpitations.   Gastrointestinal: Negative for abdominal pain, blood in stool and constipation.   Skin: Negative for color change.   Allergic/Immunologic: Negative for environmental allergies.   Neurological: Negative for dizziness, speech difficulty and headache.   Psychiatric/Behavioral: Positive for decreased concentration. The patient is nervous/anxious.      /70   Pulse 72   Temp 96.9 °F (36.1 °C) (Temporal)   Ht 160 cm (62.99\")   Wt 60.3 kg (133 lb)   BMI 23.57 kg/m²     Physical Exam  Constitutional:       Appearance: She is well-developed.   HENT:      Head: Normocephalic and atraumatic.      Comments: *wearing mask  Eyes:      Conjunctiva/sclera: Conjunctivae normal.      Pupils: Pupils are equal, round, and reactive to light.   Neck:      Musculoskeletal: Normal range of motion and neck supple.   Cardiovascular:      Rate and Rhythm: Normal rate and regular rhythm.      Pulses: Normal pulses.      Heart sounds: Normal heart sounds.   Pulmonary:      Effort: Pulmonary effort " is normal.      Breath sounds: Normal breath sounds.   Musculoskeletal: Normal range of motion.   Skin:     General: Skin is warm and dry.      Comments: Dime size oval patch, dark red.  Uniform in color.   Neurological:      Mental Status: She is alert and oriented to person, place, and time.      Cranial Nerves: Facial asymmetry present. No dysarthria.      Motor: Motor function is intact.      Coordination: Finger-Nose-Finger Test abnormal and Heel to [place] Test abnormal.      Gait: Gait is intact.      Comments: NIH= 3  1=facial palsy  2=Right upper and lower limb ataxia   Psychiatric:         Mood and Affect: Mood normal.         Behavior: Behavior normal.         Thought Content: Thought content normal.         Judgment: Judgment normal.         Procedures    Results Review:  None    PHQ-9 Total Score: 16    Assessment/Plan:  Diagnoses and all orders for this visit:    1. Facial droop (Primary)  Comments:  head CT today  Orders:  -     CT Head Without Contrast; Future    2. Essential (primary) hypertension  Comments:  normal  Orders:  -     CBC & Differential; Future  -     Comprehensive Metabolic Panel; Future    3. Lipid screening  Comments:  return for fasting labs in am  Orders:  -     Lipid Panel; Future    4. Screening for endocrine disorder  -     TSH Rfx On Abnormal To Free T4; Future    5. 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  Dispense: 15 g; Refill: 0       Patient Instructions   Head CT today  Return for fasting labs in am  If any new or worse symptoms go to ER    Return for recheck here in 2 days.    Plan of care reviewed with patient at the conclusion of today's visit. Education was provided regarding diagnosis, management and any prescribed or recommended OTC medications.  Patient verbalizes understanding of and agreement with management plan.    Return in about 2 days  (around 10/15/2020).    *Note that portions of this note were completed with a voice recognition program.  Efforts were made to edit the dictation but occasionally words are transcribed.    LIZ De Anda

## 2020-10-13 NOTE — PATIENT INSTRUCTIONS
Head CT today  Return for fasting labs in am  If any new or worse symptoms go to ER    Return for recheck here in 2 days.

## 2020-10-14 ENCOUNTER — HOSPITAL ENCOUNTER (OUTPATIENT)
Dept: CT IMAGING | Facility: HOSPITAL | Age: 70
Discharge: HOME | End: 2020-10-14

## 2020-10-14 DIAGNOSIS — R29.810 FACIAL DROOP: ICD-10-CM

## 2020-10-15 ENCOUNTER — LAB (OUTPATIENT)
Dept: LAB | Facility: HOSPITAL | Age: 70
End: 2020-10-15

## 2020-10-15 DIAGNOSIS — Z13.220 LIPID SCREENING: ICD-10-CM

## 2020-10-15 DIAGNOSIS — I10 ESSENTIAL (PRIMARY) HYPERTENSION: ICD-10-CM

## 2020-10-15 DIAGNOSIS — Z13.29 SCREENING FOR ENDOCRINE DISORDER: ICD-10-CM

## 2020-10-15 LAB
ALBUMIN SERPL-MCNC: 4 G/DL (ref 3.5–5.2)
ALBUMIN/GLOB SERPL: 1.5 G/DL
ALP SERPL-CCNC: 100 U/L (ref 39–117)
ALT SERPL W P-5'-P-CCNC: 14 U/L (ref 1–33)
ANION GAP SERPL CALCULATED.3IONS-SCNC: 7.5 MMOL/L (ref 5–15)
AST SERPL-CCNC: 21 U/L (ref 1–32)
BASOPHILS # BLD AUTO: 0.03 10*3/MM3 (ref 0–0.2)
BASOPHILS NFR BLD AUTO: 0.5 % (ref 0–1.5)
BILIRUB SERPL-MCNC: 0.3 MG/DL (ref 0–1.2)
BUN SERPL-MCNC: 12 MG/DL (ref 8–23)
BUN/CREAT SERPL: 13.6 (ref 7–25)
CALCIUM SPEC-SCNC: 9.6 MG/DL (ref 8.6–10.5)
CHLORIDE SERPL-SCNC: 107 MMOL/L (ref 98–107)
CHOLEST SERPL-MCNC: 209 MG/DL (ref 0–200)
CO2 SERPL-SCNC: 26.5 MMOL/L (ref 22–29)
CREAT SERPL-MCNC: 0.88 MG/DL (ref 0.57–1)
DEPRECATED RDW RBC AUTO: 43.6 FL (ref 37–54)
EOSINOPHIL # BLD AUTO: 0.06 10*3/MM3 (ref 0–0.4)
EOSINOPHIL NFR BLD AUTO: 1.1 % (ref 0.3–6.2)
ERYTHROCYTE [DISTWIDTH] IN BLOOD BY AUTOMATED COUNT: 12.7 % (ref 12.3–15.4)
GFR SERPL CREATININE-BSD FRML MDRD: 64 ML/MIN/1.73
GLOBULIN UR ELPH-MCNC: 2.6 GM/DL
GLUCOSE SERPL-MCNC: 90 MG/DL (ref 65–99)
HCT VFR BLD AUTO: 36.8 % (ref 34–46.6)
HDLC SERPL-MCNC: 65 MG/DL (ref 40–60)
HGB BLD-MCNC: 12.3 G/DL (ref 12–15.9)
IMM GRANULOCYTES # BLD AUTO: 0.01 10*3/MM3 (ref 0–0.05)
IMM GRANULOCYTES NFR BLD AUTO: 0.2 % (ref 0–0.5)
LDLC SERPL CALC-MCNC: 126 MG/DL (ref 0–100)
LDLC/HDLC SERPL: 1.91 {RATIO}
LYMPHOCYTES # BLD AUTO: 1.65 10*3/MM3 (ref 0.7–3.1)
LYMPHOCYTES NFR BLD AUTO: 29.6 % (ref 19.6–45.3)
MCH RBC QN AUTO: 31.5 PG (ref 26.6–33)
MCHC RBC AUTO-ENTMCNC: 33.4 G/DL (ref 31.5–35.7)
MCV RBC AUTO: 94.1 FL (ref 79–97)
MONOCYTES # BLD AUTO: 0.48 10*3/MM3 (ref 0.1–0.9)
MONOCYTES NFR BLD AUTO: 8.6 % (ref 5–12)
NEUTROPHILS NFR BLD AUTO: 3.34 10*3/MM3 (ref 1.7–7)
NEUTROPHILS NFR BLD AUTO: 60 % (ref 42.7–76)
NRBC BLD AUTO-RTO: 0 /100 WBC (ref 0–0.2)
PLATELET # BLD AUTO: 190 10*3/MM3 (ref 140–450)
PMV BLD AUTO: 11.5 FL (ref 6–12)
POTASSIUM SERPL-SCNC: 5.1 MMOL/L (ref 3.5–5.2)
PROT SERPL-MCNC: 6.6 G/DL (ref 6–8.5)
RBC # BLD AUTO: 3.91 10*6/MM3 (ref 3.77–5.28)
SODIUM SERPL-SCNC: 141 MMOL/L (ref 136–145)
TRIGL SERPL-MCNC: 99 MG/DL (ref 0–150)
TSH SERPL DL<=0.05 MIU/L-ACNC: 1.86 UIU/ML (ref 0.27–4.2)
VLDLC SERPL-MCNC: 18 MG/DL (ref 5–40)
WBC # BLD AUTO: 5.57 10*3/MM3 (ref 3.4–10.8)

## 2020-10-15 PROCEDURE — 85025 COMPLETE CBC W/AUTO DIFF WBC: CPT

## 2020-10-15 PROCEDURE — 80053 COMPREHEN METABOLIC PANEL: CPT

## 2020-10-15 PROCEDURE — 80061 LIPID PANEL: CPT

## 2020-10-15 PROCEDURE — 84443 ASSAY THYROID STIM HORMONE: CPT

## 2020-10-16 ENCOUNTER — OFFICE VISIT (OUTPATIENT)
Dept: INTERNAL MEDICINE | Facility: CLINIC | Age: 70
End: 2020-10-16

## 2020-10-16 VITALS
BODY MASS INDEX: 24.1 KG/M2 | HEIGHT: 63 IN | OXYGEN SATURATION: 98 % | WEIGHT: 136 LBS | DIASTOLIC BLOOD PRESSURE: 78 MMHG | HEART RATE: 54 BPM | SYSTOLIC BLOOD PRESSURE: 122 MMHG | TEMPERATURE: 98 F

## 2020-10-16 DIAGNOSIS — R29.810 FACIAL DROOP: Primary | ICD-10-CM

## 2020-10-16 DIAGNOSIS — I10 ESSENTIAL (PRIMARY) HYPERTENSION: ICD-10-CM

## 2020-10-16 DIAGNOSIS — R27.0: ICD-10-CM

## 2020-10-16 PROCEDURE — 99213 OFFICE O/P EST LOW 20 MIN: CPT | Performed by: NURSE PRACTITIONER

## 2020-10-16 NOTE — PATIENT INSTRUCTIONS
Will order MRI of the brain.  Start aspirin 81 mg daily.    She will go to ER if any new or worse symptoms as discussed.

## 2020-10-16 NOTE — PROGRESS NOTES
Dalia Mayfield  1950  [id number]  Patient Care Team:  Rikc Carolina MD as PCP - General (Internal Medicine)    Dalia Mayfield is a pleasant 69 y.o. female who presents for evaluation of Results (Labs & CT)    Chief Complaint   Patient presents with   • Results     Labs & CT       HPI:   2 day f/u for right sided facial droop and limb ataxia.   Feels more clear headed and normal today.  No mental fogginess today. On exam she still has ataxia in right upper arm but almost completely resolved in right lower leg.  Still has right facial droop.  No new sx.  Head CT was neg. we discussed lab results.  We discussed in detail the pros and cons of doing the MRI.  She verbalizes that if it is just for confirmation she can do without.  Cost is a concern.  We discussed that follow-up for anticoagulation, potential cardiac work-up, and lipid management would be different if she has had a stroke.  She agrees to proceed with MRI.  She again reports she has been under a significant amount of family stress recently.  Past Medical History:   Diagnosis Date   • Acute gastric ulcer without hemorrhage or perforation 1/7/2019   • Ankle fracture    • Carpal tunnel syndrome    • Diverticulosis    • Hearing loss    • Hypertension    • Lumbar back pain    • Situational depression    • Syncope and collapse    • Wrist fracture      Past Surgical History:   Procedure Laterality Date   • BREAST EXCISIONAL BIOPSY      Age 22   • CARPAL TUNNEL RELEASE     • CERVICAL LAMINECTOMY  2004   • CERVICAL LAMINECTOMY  1995   • CHOLECYSTECTOMY     • GASTRIC BYPASS  2003   • HAND SURGERY Right     CMCJ replacement- Dr. Olivares   • JOINT REPLACEMENT  1995    Thumb Joint Replacement   • LUMBAR DISCECTOMY     • TONSILLECTOMY AND ADENOIDECTOMY     • TUBAL ABDOMINAL LIGATION Bilateral    • WRIST SURGERY Right     ORIF     Family History   Problem Relation Age of Onset   • Hypertension Mother    • Heart attack Mother    • Stroke Mother    • Obesity  "Mother    • Hypertension Father    • Heart attack Father 45   • Cancer Father    • Breast cancer Maternal Aunt    • Heart attack Brother 52   • Ovarian cancer Neg Hx      Social History     Tobacco Use   Smoking Status Former Smoker   • Packs/day: 1.00   • Years: 18.00   • Pack years: 18.00   • Types: Cigarettes   • Start date:    • Quit date: 1988   • Years since quittin.2   Smokeless Tobacco Never Used     Allergies   Allergen Reactions   • Codeine Anaphylaxis     rash   • Penicillins Anaphylaxis     Rash       Current Outpatient Medications:   •  estradiol 0.5 MG/0.5GM gel, Place 1 application on the skin as directed by provider 3 (Three) Times a Week., Disp: , Rfl:   •  triamcinolone (KENALOG) 0.5 % ointment, Apply  topically to the appropriate area as directed 2 (Two) Times a Day., Disp: 15 g, Rfl: 0    Review of Systems   Constitutional: Negative for chills, fatigue and fever.   HENT: Negative for congestion, ear pain and sinus pressure.    Respiratory: Negative for cough, chest tightness, shortness of breath and wheezing.    Cardiovascular: Negative for chest pain and palpitations.   Gastrointestinal: Negative for abdominal pain, blood in stool and constipation.   Skin: Negative for color change.   Allergic/Immunologic: Negative for environmental allergies.   Neurological: Negative for dizziness, speech difficulty and headache.   Psychiatric/Behavioral: Negative for decreased concentration. The patient is not nervous/anxious.      /78 (BP Location: Left arm, Patient Position: Sitting, Cuff Size: Adult)   Pulse 54   Temp 98 °F (36.7 °C)   Ht 160 cm (62.99\")   Wt 61.7 kg (136 lb)   SpO2 98%   BMI 24.10 kg/m²     Physical Exam  Constitutional:       Appearance: She is well-developed.   HENT:      Head: Normocephalic and atraumatic.      Comments: *wearing mask  Eyes:      Conjunctiva/sclera: Conjunctivae normal.      Pupils: Pupils are equal, round, and reactive to light.   Neck:      " Musculoskeletal: Normal range of motion and neck supple.   Cardiovascular:      Rate and Rhythm: Normal rate and regular rhythm.      Pulses: Normal pulses.      Heart sounds: Normal heart sounds.   Pulmonary:      Effort: Pulmonary effort is normal.      Breath sounds: Normal breath sounds.   Musculoskeletal: Normal range of motion.   Skin:     General: Skin is warm and dry.   Neurological:      Mental Status: She is alert and oriented to person, place, and time.      Cranial Nerves: Facial asymmetry present. No cranial nerve deficit or dysarthria.      Motor: Motor function is intact.      Coordination: Finger-Nose-Finger Test abnormal.      Gait: Gait is intact.      Comments: NIH= 3  1=facial palsy  2=Right upper and lower limb ataxia (note-- lower limb ataxia almost resolved)   Psychiatric:         Mood and Affect: Mood normal.         Behavior: Behavior normal.         Thought Content: Thought content normal.         Judgment: Judgment normal.         Procedures    Results Review:  None    PHQ-9 Total Score:      Assessment/Plan:  Diagnoses and all orders for this visit:    1. Facial droop (Primary)  Comments:  start asa 81mg daily and plans for MRI brain  Orders:  -     MRI Brain Without Contrast; Future    2. Essential (primary) hypertension  Comments:  normal here, not on meds    3. Limb ataxia in two extremities  -     MRI Brain Without Contrast; Future       Patient Instructions   Will order MRI of the brain.  Start aspirin 81 mg daily.    She will go to ER if any new or worse symptoms as discussed.    Plan of care reviewed with patient at the conclusion of today's visit. Education was provided regarding diagnosis, management and any prescribed or recommended OTC medications.  Patient verbalizes understanding of and agreement with management plan.    Return for Next scheduled follow up.    *Note that portions of this note were completed with a voice recognition program.  Efforts were made to edit the  dictation but occasionally words are transcribed.    Stephanie Younger, APRN

## 2020-11-04 ENCOUNTER — OFFICE VISIT (OUTPATIENT)
Dept: INTERNAL MEDICINE | Facility: CLINIC | Age: 70
End: 2020-11-04

## 2020-11-04 VITALS
TEMPERATURE: 97.5 F | SYSTOLIC BLOOD PRESSURE: 100 MMHG | BODY MASS INDEX: 23.57 KG/M2 | WEIGHT: 133 LBS | DIASTOLIC BLOOD PRESSURE: 58 MMHG | HEIGHT: 63 IN | HEART RATE: 60 BPM

## 2020-11-04 DIAGNOSIS — R41.3 MEMORY LOSS: ICD-10-CM

## 2020-11-04 DIAGNOSIS — H90.3 SENSORINEURAL HEARING LOSS (SNHL) OF BOTH EARS: ICD-10-CM

## 2020-11-04 DIAGNOSIS — G51.0 FACIAL NERVE PALSY: ICD-10-CM

## 2020-11-04 DIAGNOSIS — Z00.00 MEDICARE ANNUAL WELLNESS VISIT, SUBSEQUENT: Primary | ICD-10-CM

## 2020-11-04 DIAGNOSIS — R10.13 EPIGASTRIC PAIN: ICD-10-CM

## 2020-11-04 DIAGNOSIS — F32.0 MILD MAJOR DEPRESSION (HCC): ICD-10-CM

## 2020-11-04 DIAGNOSIS — I10 ESSENTIAL (PRIMARY) HYPERTENSION: ICD-10-CM

## 2020-11-04 DIAGNOSIS — Z23 NEED FOR VACCINATION: ICD-10-CM

## 2020-11-04 DIAGNOSIS — Z11.59 ENCOUNTER FOR HEPATITIS C SCREENING TEST FOR LOW RISK PATIENT: ICD-10-CM

## 2020-11-04 PROBLEM — E66.3 OVERWEIGHT (BMI 25.0-29.9): Status: RESOLVED | Noted: 2019-03-18 | Resolved: 2020-11-04

## 2020-11-04 PROCEDURE — 99397 PER PM REEVAL EST PAT 65+ YR: CPT | Performed by: INTERNAL MEDICINE

## 2020-11-04 PROCEDURE — 90732 PPSV23 VACC 2 YRS+ SUBQ/IM: CPT | Performed by: INTERNAL MEDICINE

## 2020-11-04 PROCEDURE — G0439 PPPS, SUBSEQ VISIT: HCPCS | Performed by: INTERNAL MEDICINE

## 2020-11-04 PROCEDURE — G0009 ADMIN PNEUMOCOCCAL VACCINE: HCPCS | Performed by: INTERNAL MEDICINE

## 2020-11-04 NOTE — ASSESSMENT & PLAN NOTE
Psychological condition is improving with lifestyle modifications.  Regular aerobic exercise.  Psychological condition  will be reassessed at the next regular appointment She is doing lots of support groups and other things with improvement. Counseled patient regarding multimodal approach with healthy nutrition, healthy sleep, regular physical activity, social activities, and meditation.  .

## 2020-11-04 NOTE — PROGRESS NOTES
QUICK REFERENCE INFORMATION:  The ABCs of the Annual Wellness Visit    Subsequent Medicare Wellness Visit    HEALTH RISK ASSESSMENT    1950    Recent Hospitalizations:  No hospitalization(s) within the last year..        Current Medical Providers:  Patient Care Team:  Rick Carolina MD as PCP - General (Internal Medicine)        Smoking Status:  Social History     Tobacco Use   Smoking Status Former Smoker   • Packs/day: 1.00   • Years: 18.00   • Pack years: 18.00   • Types: Cigarettes   • Start date:    • Quit date: 1988   • Years since quittin.2   Smokeless Tobacco Never Used       Alcohol Consumption:  Social History     Substance and Sexual Activity   Alcohol Use No       Depression Screen:   PHQ-2/PHQ-9 Depression Screening 2020   Little interest or pleasure in doing things 2   Feeling down, depressed, or hopeless 2   Trouble falling or staying asleep, or sleeping too much 2   Feeling tired or having little energy 1   Poor appetite or overeating 2   Feeling bad about yourself - or that you are a failure or have let yourself or your family down 3   Trouble concentrating on things, such as reading the newspaper or watching television 2   Moving or speaking so slowly that other people could have noticed. Or the opposite - being so fidgety or restless that you have been moving around a lot more than usual 0   Thoughts that you would be better off dead, or of hurting yourself in some way 0   Total Score 14   If you checked off any problems, how difficult have these problems made it for you to do your work, take care of things at home, or get along with other people? Somewhat difficult       Health Habits and Functional and Cognitive Screening:  Functional & Cognitive Status 2020   Do you have difficulty preparing food and eating? No   Do you have difficulty bathing yourself, getting dressed or grooming yourself? No   Do you have difficulty using the toilet? No   Do you have  difficulty moving around from place to place? No   Do you have trouble with steps or getting out of a bed or a chair? Yes   Current Diet Well Balanced Diet   Dental Exam Up to date   Eye Exam Not up to date   Exercise (times per week) 2 times per week   Current Exercise Activities Include Stationary Bicycling/Spin Class   Do you need help using the phone?  Yes   Are you deaf or do you have serious difficulty hearing?  Yes   Do you need help with transportation? No   Do you need help shopping? No   Do you need help preparing meals?  No   Do you need help with housework?  Yes   Do you need help with laundry? No   Do you need help taking your medications? No   Do you need help managing money? No   Do you ever drive or ride in a car without wearing a seat belt? Yes   Have you felt unusual stress, anger or loneliness in the last month? Yes   Who do you live with? Other   If you need help, do you have trouble finding someone available to you? No   Have you been bothered in the last four weeks by sexual problems? No   Do you have difficulty concentrating, remembering or making decisions? Yes       Fall Risk Screen:  NICOLLE Fall Risk Assessment was completed, and patient is at MODERATE risk for falls. Assessment completed on:11/4/2020    ACE III MINI        Does the patient have evidence of cognitive impairment? Yes    Aspirin use counseling: Taking ASA appropriately as indicated    Recent Lab Results:  CMP:  Lab Results   Component Value Date    BUN 12 10/15/2020    CREATININE 0.88 10/15/2020    EGFRIFNONA 64 10/15/2020    BCR 13.6 10/15/2020     10/15/2020    K 5.1 10/15/2020    CO2 26.5 10/15/2020    CALCIUM 9.6 10/15/2020    ALBUMIN 4.00 10/15/2020    BILITOT 0.3 10/15/2020    ALKPHOS 100 10/15/2020    AST 21 10/15/2020    ALT 14 10/15/2020     HbA1c:  No results found for: HGBA1C  Microalbumin:  No results found for: MICROALBUR, POCMALB, POCCREAT  Lipid Panel  Lab Results   Component Value Date    CHOL 209 (H)  10/15/2020    TRIG 99 10/15/2020    HDL 65 (H) 10/15/2020     (H) 10/15/2020    AST 21 10/15/2020    ALT 14 10/15/2020       Visual Acuity:  No exam data present    Age-appropriate Screening Schedule:  Refer to the list below for future screening recommendations based on patient's age, sex and/or medical conditions. Orders for these recommended tests are listed in the plan section. The patient has been provided with a written plan.    Health Maintenance   Topic Date Due   • TDAP/TD VACCINES (1 - Tdap) 11/10/1971   • ZOSTER VACCINE (1 of 2) 11/10/2002   • MAMMOGRAM  12/28/2020   • COLONOSCOPY  12/10/2028   • INFLUENZA VACCINE  Completed        Subjective   History of Present Illness    Dalia Mayfield is a 69 y.o. female who presents for a Subsequent Wellness Visit.    CHRONIC CONDITIONS    The following portions of the patient's history were reviewed and updated as appropriate: allergies, current medications, past family history, past medical history, past social history, past surgical history and problem list.    Outpatient Medications Prior to Visit   Medication Sig Dispense Refill   • estradiol 0.5 MG/0.5GM gel Place 1 application on the skin as directed by provider 3 (Three) Times a Week.     • triamcinolone (KENALOG) 0.5 % ointment Apply  topically to the appropriate area as directed 2 (Two) Times a Day. 15 g 0     No facility-administered medications prior to visit.        Patient Active Problem List   Diagnosis   • Sprain of lateral collateral ligament of right knee   • Sprain of anterior talofibular ligament of right ankle   • Acute gastric ulcer without hemorrhage or perforation   • Diverticulosis of large intestine   • Essential (primary) hypertension   • Migraine with aura and without status migrainosus, not intractable   • Low back pain at multiple sites   • Mild major depression (CMS/HCC)   • Menopausal and perimenopausal disorder   • Vertigo   • Syncope and collapse   • Cervicalgia   •  "Diverticulosis of large intestine without hemorrhage   • Environmental allergies   • Aphthous ulcer of mouth   • Medicare annual wellness visit, subsequent   • Facial nerve palsy   • Memory loss   • Sensorineural hearing loss (SNHL) of both ears       Advance Care Planning:  ACP discussion was held with the patient during this visit. Patient has an advance directive (not in EMR), copy requested.    Identification of Risk Factors:  Risk factors include: Depression/Dysphoria  Hearing Problem  Polypharmacy.    Review of Systems    Compared to one year ago, the patient feels her physical health is the same.  Compared to one year ago, the patient feels her mental health is the same.    Objective     Physical Exam  Constitutional:       Appearance: Normal appearance.   HENT:      Right Ear: Tympanic membrane and ear canal normal.      Left Ear: Ear canal normal.   Eyes:      Extraocular Movements: Extraocular movements intact.      Conjunctiva/sclera: Conjunctivae normal.   Neck:      Musculoskeletal: Neck supple.   Cardiovascular:      Rate and Rhythm: Normal rate and regular rhythm.   Pulmonary:      Effort: Pulmonary effort is normal.      Breath sounds: Normal breath sounds. No wheezing.   Abdominal:      General: Abdomen is flat.      Palpations: Abdomen is soft.   Musculoskeletal:         General: No swelling or tenderness.   Skin:     General: Skin is warm and dry.      Comments: No suspicious nevi appreciated    Neurological:      General: No focal deficit present.      Mental Status: She is alert and oriented to person, place, and time.      Comments: Good get up and go and her MMSE 28/30 and good clock.    Psychiatric:         Mood and Affect: Mood normal.         Behavior: Behavior normal.          Procedures     Vitals:    11/04/20 0901   BP: 100/58   Pulse: 60   Temp: 97.5 °F (36.4 °C)   Weight: 60.3 kg (133 lb)   Height: 160 cm (62.99\")   PainSc: 0-No pain       Patient's Body mass index is 23.57 kg/m². BMI " is within normal parameters. No follow-up required..      Assessment/Plan   Problem List Items Addressed This Visit        Cardiovascular and Mediastinum    Essential (primary) hypertension    Current Assessment & Plan     Hypertension is unchanged.  Continue current treatment regimen.  Regular aerobic exercise.  Blood pressure will be reassessed at the next regular appointment.         Relevant Orders    Microalbumin / Creatinine Urine Ratio - Urine, Clean Catch       Nervous and Auditory    Facial nerve palsy    Overview     Right facial nerve palsy 10/2020 CT scan of head ok.          Current Assessment & Plan     She is improving and will follow.          Sensorineural hearing loss (SNHL) of both ears    Current Assessment & Plan     Referral to  [facility] [phone]            Other    Mild major depression (CMS/HCC)    Current Assessment & Plan     Psychological condition is improving with lifestyle modifications.  Regular aerobic exercise.  Psychological condition  will be reassessed at the next regular appointment She is doing lots of support groups and other things with improvement. Counseled patient regarding multimodal approach with healthy nutrition, healthy sleep, regular physical activity, social activities, and meditation.  .         Medicare annual wellness visit, subsequent - Primary    Current Assessment & Plan     She has good get up and go and her mood is improving.She declines medicine for mood or seeing someone other than her various support groups.  Her memory is overall ok with MMSE of 28/30. She is doing ok with her medications.She saw Dr Crowe for complete skin exam.  Age-appropriate Counseling:  Discussed preventative medicine issues with patient including regular exercise, healthy diet, stress reduction, adequate sleep and recommended age-appropriate screening studies.  Immunizations reviewed.              Memory loss    Overview     MMSE 11/4/2020 28/30 good clock          Relevant Orders    Vitamin B12      Other Visit Diagnoses     Need for vaccination        Relevant Orders    Pneumococcal Polysaccharide Vaccine 23-Valent Greater Than or Equal To 1yo Subcutaneous / IM (Completed)    Encounter for hepatitis C screening test for low risk patient        Relevant Orders    Hepatitis C Antibody    Epigastric pain            Patient Self-Management and Personalized Health Advice  The patient has been provided with information about: diet, exercise, weight management and prevention of cardiac or vascular disease and preventive services including:   · Annual Wellness Visit (AWV)  · Hepatitis C Virus Screening (beneficiaries must fall into one of the following categories to be eligible- high risk for HCV infection, born between 0104-4327, or history of blood transfusion before 1992).    Outpatient Encounter Medications as of 11/4/2020   Medication Sig Dispense Refill   • estradiol 0.5 MG/0.5GM gel Place 1 application on the skin as directed by provider 3 (Three) Times a Week.     • triamcinolone (KENALOG) 0.5 % ointment Apply  topically to the appropriate area as directed 2 (Two) Times a Day. 15 g 0     No facility-administered encounter medications on file as of 11/4/2020.        Reviewed use of high risk medication in the elderly: yes  Reviewed for potential of harmful drug interactions in the elderly: yes    Follow Up:  Return in about 6 months (around 5/4/2021) for Recheck, Labs this visit.     There are no Patient Instructions on file for this visit.    An After Visit Summary and PPPS with all of these plans were given to the patient.

## 2020-11-05 ENCOUNTER — TELEPHONE (OUTPATIENT)
Dept: INTERNAL MEDICINE | Facility: CLINIC | Age: 70
End: 2020-11-05

## 2020-11-05 PROBLEM — H90.3 SENSORINEURAL HEARING LOSS (SNHL) OF BOTH EARS: Status: ACTIVE | Noted: 2020-11-05

## 2020-11-05 NOTE — ASSESSMENT & PLAN NOTE
She has good get up and go and her mood is improving.She declines medicine for mood or seeing someone other than her various support groups.  Her memory is overall ok with MMSE of 28/30. She is doing ok with her medications.She saw Dr Crowe for complete skin exam.  Age-appropriate Counseling:  Discussed preventative medicine issues with patient including regular exercise, healthy diet, stress reduction, adequate sleep and recommended age-appropriate screening studies.  Immunizations reviewed.

## 2020-11-05 NOTE — TELEPHONE ENCOUNTER
Ok please have someone call tomorrow it is documented in note from yesterday for referral to the [phone]

## 2020-11-05 NOTE — TELEPHONE ENCOUNTER
PT NEEDS A REFERRAL TO  FOR A KOKLEAR IMPLANT. [phone],  PT STATED THAT YOU DON'T NEED A PAPER REFERRAL JUST A TELEPHONE CALL.

## 2020-11-09 ENCOUNTER — HOSPITAL ENCOUNTER (OUTPATIENT)
Dept: MRI IMAGING | Facility: HOSPITAL | Age: 70
Discharge: HOME | End: 2020-11-09
Admitting: NURSE PRACTITIONER

## 2020-11-09 DIAGNOSIS — R29.810 FACIAL DROOP: ICD-10-CM

## 2020-11-09 DIAGNOSIS — R27.0: ICD-10-CM

## 2020-11-09 PROCEDURE — 70551 MRI BRAIN STEM W/O DYE: CPT

## 2020-11-10 NOTE — TELEPHONE ENCOUNTER
Called [facility] [phone] Left a voice mail for Sulema to return call.

## 2021-01-18 ENCOUNTER — TELEPHONE (OUTPATIENT)
Dept: INTERNAL MEDICINE | Facility: CLINIC | Age: 71
End: 2021-01-18

## 2021-03-08 ENCOUNTER — TELEPHONE (OUTPATIENT)
Dept: INTERNAL MEDICINE | Facility: CLINIC | Age: 71
End: 2021-03-08

## 2021-03-08 DIAGNOSIS — Z12.31 SCREENING MAMMOGRAM, ENCOUNTER FOR: Primary | ICD-10-CM

## 2021-03-08 NOTE — TELEPHONE ENCOUNTER
PT SAID SHE RECEIVED A LETTER FROM HER INSURANCE COMPANY THAT SHE IS DUE FOR A MAMMOGRAM.  PT IS REQUESTING AN ORDER BE PLACED SO IT CAN BE SCHEDULED.  PT REQUEST CALL BACK TO LET HER KNOW ORDER HAS BEEN PLACED SO SHE CAN GET IT SCHEDULED.

## 2021-05-07 ENCOUNTER — TELEPHONE (OUTPATIENT)
Dept: INTERNAL MEDICINE | Facility: CLINIC | Age: 71
End: 2021-05-07

## 2021-05-07 NOTE — TELEPHONE ENCOUNTER
Labs ordered 11/04/21. Patient cancelled her appt 05/04. Reminder has been sent. Can we cancel orders?

## 2021-07-08 ENCOUNTER — OFFICE VISIT (OUTPATIENT)
Dept: INTERNAL MEDICINE | Facility: CLINIC | Age: 71
End: 2021-07-08

## 2021-07-08 VITALS
BODY MASS INDEX: 23.18 KG/M2 | TEMPERATURE: 97.8 F | DIASTOLIC BLOOD PRESSURE: 68 MMHG | HEIGHT: 63 IN | OXYGEN SATURATION: 98 % | SYSTOLIC BLOOD PRESSURE: 104 MMHG | WEIGHT: 130.8 LBS | HEART RATE: 65 BPM

## 2021-07-08 DIAGNOSIS — L24.89 IRRITANT CONTACT DERMATITIS DUE TO OTHER AGENTS: Primary | ICD-10-CM

## 2021-07-08 DIAGNOSIS — L50.9 URTICARIA: ICD-10-CM

## 2021-07-08 PROCEDURE — 99213 OFFICE O/P EST LOW 20 MIN: CPT | Performed by: NURSE PRACTITIONER

## 2021-07-08 RX ORDER — PREDNISONE 10 MG/1
TABLET ORAL
Qty: 21 TABLET | Refills: 0 | Status: SHIPPED | OUTPATIENT
Start: 2021-07-08

## 2021-07-08 RX ORDER — TRIAMCINOLONE ACETONIDE 5 MG/G
OINTMENT TOPICAL 2 TIMES DAILY
Qty: 15 G | Refills: 0 | Status: SHIPPED | OUTPATIENT
Start: 2021-07-08

## 2021-07-08 NOTE — PATIENT INSTRUCTIONS
Patient cleaning out old home to prepare for sale.  Likely encounter of irritant vs. Flea/bug bite.  Advised to shower, change clothes upon leaving the location.  Wash clothing in hot water.      Follow up if symptoms worsen.

## 2021-07-08 NOTE — PROGRESS NOTES
Follow Up Office Visit      Patient Name: Dalia Mayfield  : 1950   MRN: [id number]   Care Team: Patient Care Team:  Rick Carolina MD as PCP - General (Internal Medicine)    Chief Complaint:    Chief Complaint   Patient presents with   • Blister     different areas on body , itching       History of Present Illness: Dalia Mayfield is a 70 y.o. female who presents today for issues of blistery itching lesions scattered over her body times about 1 month.  Reports symptoms first appeared after she stayed into hotel rooms but also admits that at the same time, she and her significant other have started cleaning out 100-year-old home to prepare for sale.  Admits that she has been in the attic almost daily, noted some mice.    The lesions appear with white middle bump, sometimes blistery and surrounded with redness.  Associated itching.  She has been applying OTC hydrocortisone cream with some relief, but notes that she keeps getting new lesions.  Admits that her significant other has them as well that he does not have associated itching.    Reports she has examined her bed for bedbugs, but no sign of them.  Has 2 indoor cats.  Denies being out in the weeds for doing yard work routinely.      Subjective      Review of Systems:   Review of Systems   Constitutional: Negative for chills, fatigue and fever.   HENT: Negative for swollen glands and trouble swallowing.    Respiratory: Negative for chest tightness, shortness of breath and wheezing.    Cardiovascular: Negative for chest pain, palpitations and leg swelling.   Skin: Positive for color change, rash and skin lesions.   Neurological: Negative for dizziness and headache.       I have reviewed and the following portions of the patient's history were updated as appropriate: past family history, past medical history, past social history, past surgical history and problem list.    Medications:     Current Outpatient Medications:   •  estradiol 0.5 MG/0.5GM  "gel, Place 1 application on the skin as directed by provider 3 (Three) Times a Week., Disp: , Rfl:   •  predniSONE (DELTASONE) 10 MG tablet, Take 6 tablets on day 1, 5 tablets day 2, 4 tablets day 3, 3 tablets day 4, 2 tablets day 5, and 1 tablet day 6, Disp: 21 tablet, Rfl: 0  •  triamcinolone (KENALOG) 0.5 % ointment, Apply  topically to the appropriate area as directed 2 (Two) Times a Day., Disp: 15 g, Rfl: 0    Allergies:   Allergies   Allergen Reactions   • Codeine Anaphylaxis     rash   • Penicillins Anaphylaxis     Rash       Objective     Physical Exam:  Vital Signs:   Vitals:    07/08/21 0851   BP: 104/68   BP Location: Left arm   Patient Position: Sitting   Cuff Size: Adult   Pulse: 65   Temp: 97.8 °F (36.6 °C)   TempSrc: Temporal   SpO2: 98%   Weight: 59.3 kg (130 lb 12.8 oz)   Height: 160 cm (62.99\")   PainSc: 0-No pain     Body mass index is 23.18 kg/m².     Physical Exam  Vitals and nursing note reviewed.   Constitutional:       General: She is not in acute distress.     Appearance: Normal appearance.   HENT:      Head: Normocephalic and atraumatic.   Eyes:      General: No scleral icterus.     Conjunctiva/sclera: Conjunctivae normal.   Cardiovascular:      Rate and Rhythm: Normal rate and regular rhythm.      Pulses: Normal pulses.      Heart sounds: Normal heart sounds. No murmur heard.     Pulmonary:      Effort: Pulmonary effort is normal. No respiratory distress.      Breath sounds: Normal breath sounds. No stridor. No wheezing.   Musculoskeletal:      Right lower leg: No edema.      Left lower leg: No edema.   Skin:     Comments: Scattered single lesions with noted papular center with surrounding annular erythema.  No pustular/vesicular lesions noted.  Areas involved include abdomen, thighs, groin, bilateral UE.     Neurological:      General: No focal deficit present.      Mental Status: She is alert.   Psychiatric:         Mood and Affect: Mood normal.         Thought Content: Thought content " normal.         Judgment: Judgment normal.         Assessment / Plan      Assessment/Plan:   Problems Addressed This Visit    ICD-10-CM ICD-9-CM   1. Irritant contact dermatitis due to other agents  L24.89 692.89   2. Urticaria  L50.9 708.9     Contact dermatitis versus insect bite reaction/hypersensitivity  Urticaria  -Working daily in attic of 100-year-old home with known mice infestation, likely concern for possible insect bites versus other contact dermatitis  -Advised patient to avoid area when possible  -Wash clothes in hot water and shower after leaving the location  -Triamcinolone 0.5% topical ointment, apply to affected areas twice daily as needed  -Prednisone taper pack x6 days, advised to start when she has completed her work at a location    Plan of care reviewed with patient at the conclusion of today's visit. Education was provided regarding diagnosis and management.  Patient verbalizes understanding of and agreement with management plan.    Patient Instructions   Patient cleaning out old home to prepare for sale.  Likely encounter of irritant vs. Flea/bug bite.  Advised to shower, change clothes upon leaving the location.  Wash clothing in hot water.      Follow up if symptoms worsen.        Follow Up:   Return if symptoms worsen or fail to improve.        LIZ Herring  [facility] [address]    Please note that portions of this note may have been completed with a voice recognition program. Efforts were made to edit the dictations, but occasionally words are mistranscribed.

## 2021-10-04 ENCOUNTER — TELEPHONE (OUTPATIENT)
Dept: INTERNAL MEDICINE | Facility: CLINIC | Age: 71
End: 2021-10-04

## 2021-10-04 RX ORDER — AZITHROMYCIN 250 MG/1
250 TABLET, FILM COATED ORAL TAKE AS DIRECTED
Qty: 6 TABLET | Refills: 0 | Status: SHIPPED | OUTPATIENT
Start: 2021-10-04

## 2021-10-04 NOTE — TELEPHONE ENCOUNTER
PATIENT IS STATES SHE HAS PAIN IN RIGHT EAR AND SORE THROAT ON RIGHT SIDE WITH WHITE PATCHES.  PATIENT IS REQUESTING IF SOMETHING CAN BE CALLED INTO PHARMACY TO TREAT.    PATIENT USES [facility] IN [address]    PATIENT IS REQUESTING A CALL BACK TO BE ADVISED ON WHETHER OR NOT SOMETHING CAN BE CALLED INTO PHARMACY.  OK TO LEAVE VOICE MESSAGE.    CALL BACK NUMBER [phone]

## 2021-10-04 NOTE — TELEPHONE ENCOUNTER
LVM for patient to return call    HUB TO READ: Dr. Carolina will send in z pack but if does not work will need to see a APC in office or be seen at the [facility]